# Patient Record
Sex: FEMALE | Race: WHITE | NOT HISPANIC OR LATINO
[De-identification: names, ages, dates, MRNs, and addresses within clinical notes are randomized per-mention and may not be internally consistent; named-entity substitution may affect disease eponyms.]

---

## 2018-06-28 PROBLEM — Z00.00 ENCOUNTER FOR PREVENTIVE HEALTH EXAMINATION: Status: ACTIVE | Noted: 2018-06-28

## 2018-07-12 ENCOUNTER — APPOINTMENT (OUTPATIENT)
Dept: OTOLARYNGOLOGY | Facility: CLINIC | Age: 38
End: 2018-07-12
Payer: COMMERCIAL

## 2018-07-12 VITALS — BODY MASS INDEX: 25.61 KG/M2 | HEIGHT: 64 IN | WEIGHT: 150 LBS

## 2018-07-12 DIAGNOSIS — Z80.3 FAMILY HISTORY OF MALIGNANT NEOPLASM OF BREAST: ICD-10-CM

## 2018-07-12 DIAGNOSIS — Z82.2 FAMILY HISTORY OF DEAFNESS AND HEARING LOSS: ICD-10-CM

## 2018-07-12 DIAGNOSIS — Z78.9 OTHER SPECIFIED HEALTH STATUS: ICD-10-CM

## 2018-07-12 DIAGNOSIS — E11.9 TYPE 2 DIABETES MELLITUS W/OUT COMPLICATIONS: ICD-10-CM

## 2018-07-12 DIAGNOSIS — Z80.51 FAMILY HISTORY OF MALIGNANT NEOPLASM OF KIDNEY: ICD-10-CM

## 2018-07-12 PROCEDURE — 92557 COMPREHENSIVE HEARING TEST: CPT

## 2018-07-12 PROCEDURE — 92550 TYMPANOMETRY & REFLEX THRESH: CPT

## 2018-07-12 PROCEDURE — 99203 OFFICE O/P NEW LOW 30 MIN: CPT | Mod: 25

## 2018-07-19 ENCOUNTER — OUTPATIENT (OUTPATIENT)
Dept: OUTPATIENT SERVICES | Facility: HOSPITAL | Age: 38
LOS: 1 days | Discharge: HOME | End: 2018-07-19

## 2018-07-19 DIAGNOSIS — Z12.31 ENCOUNTER FOR SCREENING MAMMOGRAM FOR MALIGNANT NEOPLASM OF BREAST: ICD-10-CM

## 2018-07-26 ENCOUNTER — APPOINTMENT (OUTPATIENT)
Dept: BREAST CENTER | Facility: CLINIC | Age: 38
End: 2018-07-26

## 2018-08-02 ENCOUNTER — OUTPATIENT (OUTPATIENT)
Dept: OUTPATIENT SERVICES | Facility: HOSPITAL | Age: 38
LOS: 1 days | Discharge: HOME | End: 2018-08-02

## 2018-08-02 DIAGNOSIS — H90.3 SENSORINEURAL HEARING LOSS, BILATERAL: ICD-10-CM

## 2018-08-09 ENCOUNTER — OUTPATIENT (OUTPATIENT)
Dept: OUTPATIENT SERVICES | Facility: HOSPITAL | Age: 38
LOS: 1 days | Discharge: HOME | End: 2018-08-09

## 2018-08-09 DIAGNOSIS — H90.3 SENSORINEURAL HEARING LOSS, BILATERAL: ICD-10-CM

## 2018-08-22 ENCOUNTER — OUTPATIENT (OUTPATIENT)
Dept: OUTPATIENT SERVICES | Facility: HOSPITAL | Age: 38
LOS: 1 days | Discharge: HOME | End: 2018-08-22

## 2018-08-23 DIAGNOSIS — H90.3 SENSORINEURAL HEARING LOSS, BILATERAL: ICD-10-CM

## 2018-08-28 ENCOUNTER — OUTPATIENT (OUTPATIENT)
Dept: OUTPATIENT SERVICES | Facility: HOSPITAL | Age: 38
LOS: 1 days | Discharge: HOME | End: 2018-08-28

## 2018-08-29 DIAGNOSIS — H90.3 SENSORINEURAL HEARING LOSS, BILATERAL: ICD-10-CM

## 2018-08-30 ENCOUNTER — OUTPATIENT (OUTPATIENT)
Dept: OUTPATIENT SERVICES | Facility: HOSPITAL | Age: 38
LOS: 1 days | Discharge: HOME | End: 2018-08-30

## 2018-08-30 DIAGNOSIS — H90.3 SENSORINEURAL HEARING LOSS, BILATERAL: ICD-10-CM

## 2018-10-02 ENCOUNTER — OUTPATIENT (OUTPATIENT)
Dept: OUTPATIENT SERVICES | Facility: HOSPITAL | Age: 38
LOS: 1 days | Discharge: HOME | End: 2018-10-02

## 2018-10-03 DIAGNOSIS — H90.3 SENSORINEURAL HEARING LOSS, BILATERAL: ICD-10-CM

## 2018-10-09 ENCOUNTER — OUTPATIENT (OUTPATIENT)
Dept: OUTPATIENT SERVICES | Facility: HOSPITAL | Age: 38
LOS: 1 days | Discharge: HOME | End: 2018-10-09

## 2018-10-12 DIAGNOSIS — H90.3 SENSORINEURAL HEARING LOSS, BILATERAL: ICD-10-CM

## 2018-10-18 ENCOUNTER — OUTPATIENT (OUTPATIENT)
Dept: OUTPATIENT SERVICES | Facility: HOSPITAL | Age: 38
LOS: 1 days | Discharge: HOME | End: 2018-10-18

## 2018-10-19 DIAGNOSIS — H90.3 SENSORINEURAL HEARING LOSS, BILATERAL: ICD-10-CM

## 2018-11-20 ENCOUNTER — OUTPATIENT (OUTPATIENT)
Dept: OUTPATIENT SERVICES | Facility: HOSPITAL | Age: 38
LOS: 1 days | Discharge: HOME | End: 2018-11-20

## 2018-11-26 DIAGNOSIS — H90.3 SENSORINEURAL HEARING LOSS, BILATERAL: ICD-10-CM

## 2019-02-28 ENCOUNTER — OUTPATIENT (OUTPATIENT)
Dept: OUTPATIENT SERVICES | Facility: HOSPITAL | Age: 39
LOS: 1 days | Discharge: HOME | End: 2019-02-28

## 2019-02-28 DIAGNOSIS — H90.3 SENSORINEURAL HEARING LOSS, BILATERAL: ICD-10-CM

## 2019-05-17 ENCOUNTER — APPOINTMENT (OUTPATIENT)
Dept: NEUROLOGY | Facility: CLINIC | Age: 39
End: 2019-05-17
Payer: COMMERCIAL

## 2019-05-17 PROCEDURE — 64615 CHEMODENERV MUSC MIGRAINE: CPT

## 2019-05-21 ENCOUNTER — RECORD ABSTRACTING (OUTPATIENT)
Age: 39
End: 2019-05-21

## 2019-05-21 DIAGNOSIS — K21.9 GASTRO-ESOPHAGEAL REFLUX DISEASE W/OUT ESOPHAGITIS: ICD-10-CM

## 2019-05-21 DIAGNOSIS — F32.9 MAJOR DEPRESSIVE DISORDER, SINGLE EPISODE, UNSPECIFIED: ICD-10-CM

## 2019-05-21 DIAGNOSIS — Z92.29 PERSONAL HISTORY OF OTHER DRUG THERAPY: ICD-10-CM

## 2019-05-21 DIAGNOSIS — F41.9 ANXIETY DISORDER, UNSPECIFIED: ICD-10-CM

## 2019-05-21 DIAGNOSIS — Z87.39 PERSONAL HISTORY OF OTHER DISEASES OF THE MUSCULOSKELETAL SYSTEM AND CONNECTIVE TISSUE: ICD-10-CM

## 2019-05-21 DIAGNOSIS — Z82.0 FAMILY HISTORY OF EPILEPSY AND OTHER DISEASES OF THE NERVOUS SYSTEM: ICD-10-CM

## 2019-05-21 DIAGNOSIS — Z86.73 PERSONAL HISTORY OF TRANSIENT ISCHEMIC ATTACK (TIA), AND CEREBRAL INFARCTION W/OUT RESIDUAL DEFICITS: ICD-10-CM

## 2019-05-21 DIAGNOSIS — E10.9 TYPE 1 DIABETES MELLITUS W/OUT COMPLICATIONS: ICD-10-CM

## 2019-05-21 DIAGNOSIS — Z82.61 FAMILY HISTORY OF ARTHRITIS: ICD-10-CM

## 2019-05-21 DIAGNOSIS — Z96.41 PRESENCE OF INSULIN PUMP (EXTERNAL) (INTERNAL): ICD-10-CM

## 2019-05-21 DIAGNOSIS — Z84.89 FAMILY HISTORY OF OTHER SPECIFIED CONDITIONS: ICD-10-CM

## 2019-05-21 RX ORDER — IBUPROFEN 200 MG/1
200 TABLET ORAL
Refills: 0 | Status: ACTIVE | COMMUNITY

## 2019-05-21 RX ORDER — CITALOPRAM 40 MG/1
40 TABLET, FILM COATED ORAL DAILY
Refills: 0 | Status: ACTIVE | COMMUNITY

## 2019-05-21 RX ORDER — LORATADINE 5 MG/5 ML
SOLUTION, ORAL ORAL
Refills: 0 | Status: ACTIVE | COMMUNITY

## 2019-05-21 RX ORDER — PANTOPRAZOLE 40 MG/1
40 TABLET, DELAYED RELEASE ORAL TWICE DAILY
Refills: 0 | Status: ACTIVE | COMMUNITY

## 2019-05-21 RX ORDER — OMEGA-3/DHA/EPA/FISH OIL 300-1000MG
400 CAPSULE ORAL
Refills: 0 | Status: ACTIVE | COMMUNITY

## 2019-05-21 RX ORDER — CLONAZEPAM 0.5 MG/1
0.5 TABLET ORAL
Refills: 0 | Status: ACTIVE | COMMUNITY

## 2019-05-21 RX ORDER — BACILLUS COAGULANS/INULIN 1B-250 MG
CAPSULE ORAL
Refills: 0 | Status: ACTIVE | COMMUNITY

## 2019-05-30 ENCOUNTER — RECORD ABSTRACTING (OUTPATIENT)
Age: 39
End: 2019-05-30

## 2019-06-04 ENCOUNTER — OUTPATIENT (OUTPATIENT)
Dept: OUTPATIENT SERVICES | Facility: HOSPITAL | Age: 39
LOS: 1 days | Discharge: HOME | End: 2019-06-04

## 2019-06-04 ENCOUNTER — OUTPATIENT (OUTPATIENT)
Dept: OUTPATIENT SERVICES | Facility: HOSPITAL | Age: 39
LOS: 1 days | Discharge: HOME | End: 2019-06-04
Payer: COMMERCIAL

## 2019-06-04 DIAGNOSIS — M22.2X1 PATELLOFEMORAL DISORDERS, RIGHT KNEE: ICD-10-CM

## 2019-06-04 PROCEDURE — 73564 X-RAY EXAM KNEE 4 OR MORE: CPT | Mod: 26,50

## 2019-06-04 PROCEDURE — 73522 X-RAY EXAM HIPS BI 3-4 VIEWS: CPT | Mod: 26

## 2019-06-05 DIAGNOSIS — H90.3 SENSORINEURAL HEARING LOSS, BILATERAL: ICD-10-CM

## 2019-06-10 ENCOUNTER — APPOINTMENT (OUTPATIENT)
Dept: NEUROLOGY | Facility: CLINIC | Age: 39
End: 2019-06-10
Payer: COMMERCIAL

## 2019-06-10 ENCOUNTER — APPOINTMENT (OUTPATIENT)
Dept: NEUROLOGY | Facility: CLINIC | Age: 39
End: 2019-06-10

## 2019-06-10 VITALS
BODY MASS INDEX: 25.61 KG/M2 | HEART RATE: 92 BPM | WEIGHT: 150 LBS | HEIGHT: 64 IN | SYSTOLIC BLOOD PRESSURE: 128 MMHG | DIASTOLIC BLOOD PRESSURE: 85 MMHG

## 2019-06-10 PROCEDURE — 99214 OFFICE O/P EST MOD 30 MIN: CPT

## 2019-06-10 NOTE — HISTORY OF PRESENT ILLNESS
[FreeTextEntry1] : Ms. Muller is a 38-year-old woman last seen by me in the office on 12/12/2019 for follow-up of her migraines.  She also had history of stroke.  She had in the past had low antithrombin III levels.  Hemoglobin A1c is elevated, last one was 7.6.  She saw hematology and oncology and was told everything was fine and did not have risk for clots.  Her son recently passed away.  She has been receiving Botox injections. \par \par She says the botox helps with migraines for about 2.5 months but last 2-3 weeks are not controlled well.  She increased her topamax recently after speaking with me and her  is here and says she has been more confused recently.  She has poor memory and sometimes will stare and not respond for periods at a time.  She also does not sleep well and her psychiatrist started her back on trazodone\par

## 2019-06-10 NOTE — PHYSICAL EXAM
[FreeTextEntry1] : Orientation: oriented to person, oriented to place and oriented to time. \par Attention: poor concentrating ability and visual attention was not decreased. \par Language: no difficulty naming common objects, no difficulty repeating a phrase, no difficulty writing a sentence, fluency intact, comprehension intact and reading intact. \par Fund of knowledge: displays adequate knowledge of personal past history. \par Cranial Nerves: visual acuity intact bilaterally, visual fields full to confrontation, pupils equal round and reactive to light, extraocular motion intact, facial sensation intact symmetrically, face symmetrical, hearing was intact bilaterally, tongue and palate midline, head turning and shoulder shrug symmetric and there was no tongue deviation with protrusion. \par Motor: muscle tone was normal in all four extremities, muscle strength was normal in all four extremities and normal bulk in all four extremities. \par Sensory exam: light touch, PP, Vibration was intact. \par Coordination:. normal gait. balance was intact. there was no past-pointing. no tremor present. \par Deep tendon reflexes: \par Biceps right 2+. Biceps left 2+.  \par Triceps right 2+. Triceps left 2+.  LOC\par Brachioradialis right 2+. Brachioradialis left 2+.  \par Patella right 2+. Patella left 2+.  \par Ankle jerk right 2+. Ankle jerk left 2+. \par Plantar responses normal on the right, normal on the left.  \par

## 2019-06-10 NOTE — DISCUSSION/SUMMARY
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 Her migraines appear to be well controlled with Botox however I believe the Topamax is giving her side effects. Will decrease Topamax to 50 mg one tab in the morning 2 at night and then one tab b.i.d. after 2 weeks. We'll also send her for a 48 hour and with her EEG neuropsych testing and have her followup care after both tests are complete

## 2019-06-10 NOTE — REVIEW OF SYSTEMS
[Confused or Disoriented] : confusion [Memory Lapses or Loss] : memory loss [Decr. Concentrating Ability] : decreased concentrating ability [Difficulty with Language] : ~M difficulty with language [Changed Thought Patterns] : changed thought patterns [Repeating Questions] : repeated questioning about recent events [Difficulties in Speech] : speech difficulties [Migraine Headache] : migraine headaches [Negative] : Heme/Lymph

## 2019-06-11 ENCOUNTER — RX CHANGE (OUTPATIENT)
Age: 39
End: 2019-06-11

## 2019-06-13 ENCOUNTER — OUTPATIENT (OUTPATIENT)
Dept: OUTPATIENT SERVICES | Facility: HOSPITAL | Age: 39
LOS: 1 days | Discharge: HOME | End: 2019-06-13

## 2019-06-13 DIAGNOSIS — H90.3 SENSORINEURAL HEARING LOSS, BILATERAL: ICD-10-CM

## 2019-06-14 ENCOUNTER — OTHER (OUTPATIENT)
Age: 39
End: 2019-06-14

## 2019-06-20 ENCOUNTER — APPOINTMENT (OUTPATIENT)
Dept: NEUROLOGY | Facility: CLINIC | Age: 39
End: 2019-06-20
Payer: COMMERCIAL

## 2019-06-20 PROCEDURE — 95816 EEG AWAKE AND DROWSY: CPT

## 2019-06-21 ENCOUNTER — APPOINTMENT (OUTPATIENT)
Dept: NEUROLOGY | Facility: CLINIC | Age: 39
End: 2019-06-21
Payer: COMMERCIAL

## 2019-06-21 PROCEDURE — 95953: CPT

## 2019-08-14 ENCOUNTER — OUTPATIENT (OUTPATIENT)
Dept: OUTPATIENT SERVICES | Facility: HOSPITAL | Age: 39
LOS: 1 days | Discharge: HOME | End: 2019-08-14

## 2019-08-14 DIAGNOSIS — R41.3 OTHER AMNESIA: ICD-10-CM

## 2019-08-14 DIAGNOSIS — G31.84 MILD COGNITIVE IMPAIRMENT OF UNCERTAIN OR UNKNOWN ETIOLOGY: ICD-10-CM

## 2019-09-04 ENCOUNTER — APPOINTMENT (OUTPATIENT)
Dept: NEUROLOGY | Facility: CLINIC | Age: 39
End: 2019-09-04
Payer: COMMERCIAL

## 2019-09-04 PROCEDURE — 64615 CHEMODENERV MUSC MIGRAINE: CPT

## 2019-09-04 NOTE — PROCEDURE
[FreeTextEntry2] : intractable migraine [FreeTextEntry1] : Botox  [FreeTextEntry3] : Procedure:  Botox Injection  \par \par Indication:  Intractable migraine\par \par Approval:  “No prior auth needed for Botox – [facility]”\par \par :  Inderjit Reza M.D.\par \par Operation:  Procedure explained.  Risks of bleeding, infection, pain, swallow difficulty, eyelid droop, neck weakness, generalized weakness explained.  Consent obtained.  Next two 100 units vials of Onabotulinum toxin Type A, both with Lot # [id number], exp: 03/2022 were mixed with 2 cc’s of normal saline each for a dilution of 5 units per 0.1 ml.  Next injections were performed using a 30 gauge ½” needle as follows:\par \par Procerus:  5 units\par :  5 units injected on right and 5 units injected on left\par Frontalis 10 units divided into two on right and 10 units divided into two locations on left\par Temporalis 20 units divided into four locations on right and 20 units divided into four locations on left.\par Occipitalis 15 units divided into 3 locations on right and 15 units divided into 3 locations on left.\par Cervical paraspinals 10 units divided into 2 locations on right and 10 units divided into 2 locations on the left.\par Trapezius muscles 15 units divided into 3 locations on right and 15 units divided into 3 locations on left.\par \par Total Botox injected:  155 units\par Total Botox discarded:  45 units\par \par Complications:  none\par \par Patient reports previous Botox injection worked well with significant reduction in migraine.  She notes her neurologist has discontinued Topamax treatment due to blurred vision and reports her vision improved.\par \par \par Inderjit Reza M.D.\par \par FP/dh/rr\par \par  [FreeTextEntry4] : none

## 2019-09-05 ENCOUNTER — MED ADMIN CHARGE (OUTPATIENT)
Age: 39
End: 2019-09-05

## 2019-09-05 RX ORDER — ONABOTULINUMTOXINA 100 [USP'U]/1
100 INJECTION, POWDER, LYOPHILIZED, FOR SOLUTION INTRADERMAL; INTRAMUSCULAR
Qty: 1 | Refills: 0 | Status: COMPLETED | OUTPATIENT
Start: 2019-09-04

## 2019-10-15 ENCOUNTER — OUTPATIENT (OUTPATIENT)
Dept: OUTPATIENT SERVICES | Facility: HOSPITAL | Age: 39
LOS: 1 days | Discharge: HOME | End: 2019-10-15

## 2019-10-17 DIAGNOSIS — H90.3 SENSORINEURAL HEARING LOSS, BILATERAL: ICD-10-CM

## 2019-11-11 ENCOUNTER — APPOINTMENT (OUTPATIENT)
Dept: NEUROLOGY | Facility: CLINIC | Age: 39
End: 2019-11-11
Payer: COMMERCIAL

## 2019-11-11 VITALS
HEART RATE: 111 BPM | HEIGHT: 64 IN | DIASTOLIC BLOOD PRESSURE: 78 MMHG | SYSTOLIC BLOOD PRESSURE: 146 MMHG | WEIGHT: 155 LBS | BODY MASS INDEX: 26.46 KG/M2

## 2019-11-11 PROCEDURE — 99214 OFFICE O/P EST MOD 30 MIN: CPT

## 2019-11-11 NOTE — HISTORY OF PRESENT ILLNESS
[FreeTextEntry1] : Ms. Muller is a 38-year-old woman last seen by me in the office on  6/10/2019 for follow-up of her migraines. She also had history of stroke. She had in the past had low antithrombin III levels. Hemoglobin A1c is elevated, last one was 7.6. She saw hematology and oncology and was told everything was fine and did not have risk for clots. 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 Reza and she stopped almost all her medications and is feeling clearer.\par Neuropsych testing showed severe depression and PTSD as cause for her cognitive complaints.

## 2019-11-11 NOTE — PHYSICAL EXAM
[FreeTextEntry1] : a+Ox3\par language and attention normal\par CN 2-12 normal\par Power 5/5 throughout\par decreased temp, vib on right extremities\par FTN NL\par DTR 0 throughout\par Gait normal\par  but unable to tandem and rhomberg absent\par

## 2019-11-11 NOTE — REVIEW OF SYSTEMS
[Feeling Poorly] : feeling poorly [Feeling Tired] : feeling tired [Tingling] : tingling [Cluster Headache] : cluster headaches [Migraine Headache] : migraine headaches [Tension Headache] : tension-type headaches [Depression] : depression [Emotional Problems] : emotional problems [Eyesight Problems] : eyesight problems [Loss Of Hearing] : hearing loss [Shortness Of Breath] : shortness of breath [Arthralgias] : arthralgias [Joint Pain] : joint pain [As Noted in HPI] : as noted in HPI [Negative] : Integumentary

## 2019-11-11 NOTE — DISCUSSION/SUMMARY
[FreeTextEntry1] : Ms. 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  She also gets severe headaches different form her migraines and associated with neck pain.  Her cognitive dysfunction was likely a combination of medication sided effects exacerbated by severe depression and PTSD\par 1. MRI brain w/o PHILL and MRI cervical spine w/o PHILL\par 2. EMG/NCS of upper and lower extremities (mitochnodrial dysfunction with hearing loss, neuropathy)\par

## 2019-11-19 ENCOUNTER — OUTPATIENT (OUTPATIENT)
Dept: OUTPATIENT SERVICES | Facility: HOSPITAL | Age: 39
LOS: 1 days | Discharge: HOME | End: 2019-11-19

## 2019-11-19 DIAGNOSIS — H90.3 SENSORINEURAL HEARING LOSS, BILATERAL: ICD-10-CM

## 2019-11-27 ENCOUNTER — APPOINTMENT (OUTPATIENT)
Dept: NEUROLOGY | Facility: CLINIC | Age: 39
End: 2019-11-27
Payer: COMMERCIAL

## 2019-11-27 DIAGNOSIS — R20.0 ANESTHESIA OF SKIN: ICD-10-CM

## 2019-11-27 PROCEDURE — 95910 NRV CNDJ TEST 7-8 STUDIES: CPT

## 2019-11-27 PROCEDURE — 95886 MUSC TEST DONE W/N TEST COMP: CPT

## 2019-12-13 ENCOUNTER — APPOINTMENT (OUTPATIENT)
Dept: NEUROLOGY | Facility: CLINIC | Age: 39
End: 2019-12-13
Payer: COMMERCIAL

## 2019-12-13 PROCEDURE — 64615 CHEMODENERV MUSC MIGRAINE: CPT

## 2019-12-13 RX ORDER — ONABOTULINUMTOXINA 100 [USP'U]/1
100 INJECTION, POWDER, LYOPHILIZED, FOR SOLUTION INTRADERMAL; INTRAMUSCULAR
Qty: 1 | Refills: 0 | Status: COMPLETED | OUTPATIENT
Start: 2019-12-13

## 2019-12-13 RX ADMIN — ONABOTULINUMTOXINA 0 UNIT: 100 INJECTION, POWDER, LYOPHILIZED, FOR SOLUTION INTRADERMAL; INTRAMUSCULAR at 00:00

## 2019-12-13 NOTE — PROCEDURE
[FreeTextEntry1] : Botox [FreeTextEntry2] : Intractable Migraine [FreeTextEntry4] : none [FreeTextEntry3] : Indication: Intractable migraine\par \par Approval: “No prior auth needed for Botox – [facility]”\par \par : Inderjit Reza M.D.\par \par Operation: Procedure explained. Risks of bleeding, infection, pain, swallow difficulty, eyelid droop, neck weakness, generalized weakness explained. Consent obtained. Next two 100 units vials of Onabotulinum toxin Type A, both with Lot # [id number], exp: 05/2022 were mixed with 2 cc’s of normal saline each for a dilution of 5 units per 0.1 ml. Next injections were performed using a 30 gauge ½” needle as follows:\par \par Procerus: 5 units\par : 5 units injected on right and 5 units injected on left\par Frontalis 10 units divided into two on right and 10 units divided into two locations on left\par Temporalis 20 units divided into four locations on right and 20 units divided into four locations on left.\par Occipitalis 15 units divided into 3 locations on right and 15 units divided into 3 locations on left.\par Cervical paraspinals 10 units divided into 2 locations on right and 10 units divided into 2 locations on the left.\par Trapezius muscles 15 units divided into 3 locations on right and 15 units divided into 3 locations on left.\par \par Total Botox injected: 155 units\par Total Botox discarded: 45 units\par \par Complications: none

## 2019-12-17 ENCOUNTER — OUTPATIENT (OUTPATIENT)
Dept: OUTPATIENT SERVICES | Facility: HOSPITAL | Age: 39
LOS: 1 days | Discharge: HOME | End: 2019-12-17

## 2019-12-18 DIAGNOSIS — H90.3 SENSORINEURAL HEARING LOSS, BILATERAL: ICD-10-CM

## 2020-01-31 ENCOUNTER — EMERGENCY (EMERGENCY)
Facility: HOSPITAL | Age: 40
LOS: 0 days | Discharge: HOME | End: 2020-01-31
Attending: EMERGENCY MEDICINE | Admitting: EMERGENCY MEDICINE
Payer: COMMERCIAL

## 2020-01-31 VITALS
OXYGEN SATURATION: 100 % | SYSTOLIC BLOOD PRESSURE: 150 MMHG | DIASTOLIC BLOOD PRESSURE: 85 MMHG | RESPIRATION RATE: 18 BRPM | HEART RATE: 82 BPM

## 2020-01-31 VITALS
SYSTOLIC BLOOD PRESSURE: 139 MMHG | DIASTOLIC BLOOD PRESSURE: 84 MMHG | WEIGHT: 154.98 LBS | RESPIRATION RATE: 18 BRPM | HEART RATE: 96 BPM | TEMPERATURE: 98 F
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
## 2020-01-31 DIAGNOSIS — R51 HEADACHE: ICD-10-CM
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
## 2020-01-31 DIAGNOSIS — Z79.899 OTHER LONG TERM (CURRENT) DRUG THERAPY: ICD-10-CM
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
## 2020-01-31 DIAGNOSIS — S60.512A ABRASION OF LEFT HAND, INITIAL ENCOUNTER: ICD-10-CM

## 2020-01-31 PROCEDURE — 71046 X-RAY EXAM CHEST 2 VIEWS: CPT | Mod: 26

## 2020-01-31 PROCEDURE — 73590 X-RAY EXAM OF LOWER LEG: CPT | Mod: 26,LT

## 2020-01-31 PROCEDURE — 73130 X-RAY EXAM OF HAND: CPT | Mod: 26,LT

## 2020-01-31 PROCEDURE — 70450 CT HEAD/BRAIN W/O DYE: CPT | Mod: 26

## 2020-01-31 PROCEDURE — 99285 EMERGENCY DEPT VISIT HI MDM: CPT

## 2020-01-31 RX ORDER — CITALOPRAM 10 MG/1
1 TABLET, FILM COATED ORAL
Qty: 0 | Refills: 0 | DISCHARGE

## 2020-01-31 RX ORDER — ONDANSETRON 8 MG/1
1 TABLET, FILM COATED ORAL
Qty: 15 | Refills: 0
Start: 2020-01-31 | End: 2020-02-04

## 2020-01-31 RX ORDER — ONDANSETRON 8 MG/1
8 TABLET, FILM COATED ORAL ONCE
Refills: 0 | Status: COMPLETED | OUTPATIENT
Start: 2020-01-31 | End: 2020-01-31

## 2020-01-31 RX ADMIN — Medication 1 APPLICATION(S): at 11:35

## 2020-01-31 RX ADMIN — ONDANSETRON 8 MILLIGRAM(S): 8 TABLET, FILM COATED ORAL at 11:36

## 2020-01-31 NOTE — ED PROVIDER NOTE - PHYSICAL EXAMINATION
GEN: NAD, comfortable  CARDIO: RRR, no m/r/g  RESP: CTAB, no w/r/r  ABD: soft, NT/ND, +BS  EXT: no edema, pp b/l, abrasion to L hand  NEURO: AAOx3, strength 5/5 UE + LE, no focal deficits, no facial asymmetry

## 2020-01-31 NOTE — ED PROVIDER NOTE - NSFOLLOWUPCLINICS_GEN_ALL_ED_FT
[facility] Concussion Program  Concussion Program  [address] [place] [address]   Phone: [phone]  Fax:   Follow Up Time:

## 2020-01-31 NOTE — ED PROVIDER NOTE - CARE PROVIDER_API CALL
Juan Carlos Tapia)  EEGEpilepsy; Neurology  [address] [place] [address], [address]  [place] [address]  Phone: [phone]  Fax: [phone]  Follow Up Time:
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
## 2020-01-31 NOTE — ED PROVIDER NOTE - CLINICAL SUMMARY MEDICAL DECISION MAKING FREE TEXT BOX
fx contemplated: unlikely after review of diagnostic testing. In my opinion, out patient treatment and follow up are appropriate.
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
## 2020-01-31 NOTE — ED PROVIDER NOTE - PATIENT PORTAL LINK FT
You can access the FollowMyHealth Patient Portal offered by [facility] by registering at the following website: [email or web address]. By joining [facility]’s FollowMyHealth portal, you will also be able to view your health information using other applications (apps) compatible with our system.
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
## 2020-01-31 NOTE — ED PROVIDER NOTE - NS ED ROS FT
REVIEW OF SYSTEMS:    CONSTITUTIONAL: No weakness, fevers or chills  EYES/ENT: No visual changes;  No vertigo or throat pain   NECK: No pain or stiffness  RESPIRATORY: No cough, wheezing, hemoptysis; No shortness of breath  CARDIOVASCULAR: No chest pain or palpitations  GASTROINTESTINAL: No abdominal or epigastric pain. No nausea, vomiting, or hematemesis; No diarrhea or constipation. No melena or hematochezia.  GENITOURINARY: No dysuria, frequency or hematuria  NEUROLOGICAL: No numbness or weakness  MSK: see HPI  SKIN: No itching, abrasion to L hand

## 2020-02-04 ENCOUNTER — EMERGENCY (EMERGENCY)
Facility: HOSPITAL | Age: 40
LOS: 0 days | Discharge: HOME | End: 2020-02-04
Attending: EMERGENCY MEDICINE | Admitting: EMERGENCY MEDICINE
Payer: COMMERCIAL

## 2020-02-04 VITALS
DIASTOLIC BLOOD PRESSURE: 88 MMHG | TEMPERATURE: 99 F | RESPIRATION RATE: 20 BRPM | SYSTOLIC BLOOD PRESSURE: 145 MMHG | HEART RATE: 97 BPM

## 2020-02-04 DIAGNOSIS — R06.02 SHORTNESS OF BREATH: ICD-10-CM

## 2020-02-04 DIAGNOSIS — E11.9 TYPE 2 DIABETES MELLITUS WITHOUT COMPLICATIONS: ICD-10-CM

## 2020-02-04 PROBLEM — H91.90 UNSPECIFIED HEARING LOSS, UNSPECIFIED EAR: Chronic | Status: ACTIVE | Noted: 2020-01-31

## 2020-02-04 PROBLEM — E88.41: Chronic | Status: ACTIVE | Noted: 2020-01-31

## 2020-02-04 PROCEDURE — 71045 X-RAY EXAM CHEST 1 VIEW: CPT | Mod: 26

## 2020-02-04 PROCEDURE — 93010 ELECTROCARDIOGRAM REPORT: CPT

## 2020-02-04 PROCEDURE — 99284 EMERGENCY DEPT VISIT MOD MDM: CPT

## 2020-02-04 NOTE — ED PROVIDER NOTE - PLAN OF CARE
CXR and EKG done were normal. Vital signs, including oxygen saturation were also normal. If you have any more episodes of similar difficulty breathing, or if you have any chest pain, fevers, or chills, contact a physician immediately or come back to ED.

## 2020-02-04 NOTE — ED PROVIDER NOTE - CARE PROVIDER_API CALL
Odell Servin)  Cardiovascular Disease; Interventional Cardiology  [address] [place] [address]  Phone: [phone]  Fax: [phone]  Follow Up Time:
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
## 2020-02-04 NOTE — ED PROVIDER NOTE - NS ED ROS FT
REVIEW OF SYSTEMS:    CONSTITUTIONAL: No weakness, fevers or chills  EYES/ENT: No visual changes;  No vertigo or throat pain   NECK: No pain or stiffness  RESPIRATORY: see HPI  CARDIOVASCULAR: No chest pain or palpitations  GASTROINTESTINAL: No abdominal or epigastric pain. No nausea, vomiting, or hematemesis; No diarrhea or constipation. No melena or hematochezia.  GENITOURINARY: No dysuria, frequency or hematuria  NEUROLOGICAL: No numbness or weakness  SKIN: No itching, rashes

## 2020-02-04 NOTE — ED PROVIDER NOTE - ATTENDING CONTRIBUTION TO CARE
38yo woman h/o MELAS, DM c/o brief episode of "choking" while at work. Pt was well until she put a piece of candy into her mouth which made it water, and suddenly she started to feel her throat was closing. She removed the candy, but continued to have diff breathing, with what, on her description, is suggestive of stridor. She continued to have these sx until EMS arrived and gave her oxygen, after which it completely resolved. She is asymptomatic in the ED. Pt reports that these sx are not like her prior panic attacks, however she admits to feeling panicked when she couldn't breathe. On exam she is nontoxic appearing and comfortable, with clear voice and normal HEENT exam. No stridor or hoarseness now, lungs CTA, CVS1S2 RRR abd soft, NT. Pt reports she feels back to baseline. Doubt CAD/PE/PNA as no sx now. Sounds like laryngospasm by history. Pt reports she may have had 1 or 2 similar episodes in the remote past. Will check EKG and CXR, likely d/c to f/u ENT/GI.

## 2020-02-04 NOTE — ED PROVIDER NOTE - PATIENT PORTAL LINK FT
You can access the FollowMyHealth Patient Portal offered by [facility] by registering at the following website: [email or web address]. By joining [employer]’s FollowMyHealth portal, you will also be able to view your health information using other applications (apps) compatible with our system.

## 2020-02-04 NOTE — ED PROVIDER NOTE - CLINICAL SUMMARY MEDICAL DECISION MAKING FREE TEXT BOX
EKG and CXR unremarkable. Sx suggestive of brief laryngospasm. Could be due to GERD, and discussed this w pt. She will f/u PMD and return to the ED for further episodes or any new sx.

## 2020-02-04 NOTE — ED PROVIDER NOTE - CARE PLAN
Principal Discharge DX:	SOB (shortness of breath)  Assessment and plan of treatment:	CXR and EKG done were normal. Vital signs, including oxygen saturation were also normal. If you have any more episodes of similar difficulty breathing, or if you have any chest pain, fevers, or chills, contact a physician immediately or come back to ED.

## 2020-02-04 NOTE — ED ADULT NURSE NOTE - OBJECTIVE STATEMENT
pt presents with difficulty breathing while at work x 15-20 min. symptoms have since resolved, denies c/o cp, n/v/d.

## 2020-03-04 PROBLEM — F39 UNSPECIFIED MOOD [AFFECTIVE] DISORDER: Chronic | Status: ACTIVE | Noted: 2020-02-04

## 2020-03-04 PROBLEM — G43.909 MIGRAINE, UNSPECIFIED, NOT INTRACTABLE, WITHOUT STATUS MIGRAINOSUS: Chronic | Status: ACTIVE | Noted: 2020-02-04

## 2020-03-04 PROBLEM — F41.9 ANXIETY DISORDER, UNSPECIFIED: Chronic | Status: ACTIVE | Noted: 2020-02-04

## 2020-03-12 ENCOUNTER — APPOINTMENT (OUTPATIENT)
Dept: NEUROLOGY | Facility: CLINIC | Age: 40
End: 2020-03-12

## 2020-03-13 ENCOUNTER — APPOINTMENT (OUTPATIENT)
Dept: NEUROLOGY | Facility: CLINIC | Age: 40
End: 2020-03-13

## 2020-04-17 ENCOUNTER — APPOINTMENT (OUTPATIENT)
Dept: NEUROLOGY | Facility: CLINIC | Age: 40
End: 2020-04-17
Payer: COMMERCIAL

## 2020-04-17 PROCEDURE — 95723 EEG PHY/QHP>60<84 HR W/O VID: CPT

## 2020-04-17 PROCEDURE — 95700 EEG CONT REC W/VID EEG TECH: CPT

## 2020-04-18 PROCEDURE — 95708 EEG WO VID EA 12-26HR UNMNTR: CPT

## 2020-04-19 PROCEDURE — 95708 EEG WO VID EA 12-26HR UNMNTR: CPT

## 2020-04-20 ENCOUNTER — APPOINTMENT (OUTPATIENT)
Dept: NEUROLOGY | Facility: CLINIC | Age: 40
End: 2020-04-20

## 2020-04-20 PROCEDURE — 95708 EEG WO VID EA 12-26HR UNMNTR: CPT

## 2020-05-08 ENCOUNTER — OUTPATIENT (OUTPATIENT)
Dept: OUTPATIENT SERVICES | Facility: HOSPITAL | Age: 40
LOS: 1 days | Discharge: HOME | End: 2020-05-08

## 2020-05-08 DIAGNOSIS — H90.3 SENSORINEURAL HEARING LOSS, BILATERAL: ICD-10-CM

## 2020-06-10 ENCOUNTER — OUTPATIENT (OUTPATIENT)
Dept: OUTPATIENT SERVICES | Facility: HOSPITAL | Age: 40
LOS: 1 days | Discharge: HOME | End: 2020-06-10

## 2020-06-10 DIAGNOSIS — H90.3 SENSORINEURAL HEARING LOSS, BILATERAL: ICD-10-CM

## 2020-06-17 ENCOUNTER — APPOINTMENT (OUTPATIENT)
Dept: NEUROLOGY | Facility: CLINIC | Age: 40
End: 2020-06-17
Payer: COMMERCIAL

## 2020-06-17 VITALS
OXYGEN SATURATION: 94 % | WEIGHT: 155 LBS | SYSTOLIC BLOOD PRESSURE: 142 MMHG | DIASTOLIC BLOOD PRESSURE: 97 MMHG | HEIGHT: 71 IN | BODY MASS INDEX: 21.7 KG/M2 | TEMPERATURE: 98 F | HEART RATE: 94 BPM

## 2020-06-17 PROCEDURE — 64615 CHEMODENERV MUSC MIGRAINE: CPT

## 2020-06-17 RX ADMIN — ONABOTULINUMTOXINA 0 UNIT: 100 INJECTION, POWDER, LYOPHILIZED, FOR SOLUTION INTRADERMAL; INTRAMUSCULAR at 00:00

## 2020-06-17 NOTE — PROCEDURE
[FreeTextEntry1] : Botox [FreeTextEntry3] : Procedure Performed: Botox. \par Indications: Intractable Migraine. \par Anesthesia: none. \par Procedure Note: Indication: Intractable migraine\par \par Approval: “No prior auth needed for Botox – [facility]”\par \par : Inderjit Reza M.D.\par \par Operation: Procedure explained. Risks of bleeding, infection, pain, swallow difficulty, eyelid droop, neck weakness, generalized weakness explained. Consent obtained. Next two 100 units vials of Onabotulinum toxin Type A, both with Lot # [id number], exp: 12/2022 were mixed with 2 cc’s of normal saline each for a dilution of 5 units per 0.1 ml. Next injections were performed using a 30 gauge ½” needle as follows:\par \par Procerus: 5 units\par : 5 units injected on right and 5 units injected on left\par Frontalis 10 units divided into two on right and 10 units divided into two locations on left\par Temporalis 20 units divided into four locations on right and 20 units divided into four locations on left.\par Occipitalis 15 units divided into 3 locations on right and 15 units divided into 3 locations on left.\par Cervical paraspinals 10 units divided into 2 locations on right and 10 units divided into 2 locations on the left.\par Trapezius muscles 15 units divided into 3 locations on right and 15 units divided into 3 locations on left.\par \par Total Botox injected: 155 units\par Total Botox discarded: 45 units\par \par Complications: none.  [FreeTextEntry4] : none [FreeTextEntry2] : Intractable migraine

## 2020-06-18 ENCOUNTER — MED ADMIN CHARGE (OUTPATIENT)
Age: 40
End: 2020-06-18

## 2020-06-18 RX ORDER — ONABOTULINUMTOXINA 100 [USP'U]/1
100 INJECTION, POWDER, LYOPHILIZED, FOR SOLUTION INTRADERMAL; INTRAMUSCULAR
Qty: 1 | Refills: 0 | Status: COMPLETED | OUTPATIENT
Start: 2020-06-17

## 2020-06-23 ENCOUNTER — TRANSCRIPTION ENCOUNTER (OUTPATIENT)
Age: 40
End: 2020-06-23

## 2020-07-17 DIAGNOSIS — R10.30 LOWER ABDOMINAL PAIN, UNSPECIFIED: ICD-10-CM

## 2020-07-17 DIAGNOSIS — M25.561 PAIN IN RIGHT KNEE: ICD-10-CM

## 2020-07-17 DIAGNOSIS — M25.562 PAIN IN RIGHT KNEE: ICD-10-CM

## 2020-07-26 ENCOUNTER — OUTPATIENT (OUTPATIENT)
Dept: OUTPATIENT SERVICES | Facility: HOSPITAL | Age: 40
LOS: 1 days | Discharge: HOME | End: 2020-07-26

## 2020-07-26 DIAGNOSIS — Z11.59 ENCOUNTER FOR SCREENING FOR OTHER VIRAL DISEASES: ICD-10-CM

## 2020-07-29 ENCOUNTER — APPOINTMENT (OUTPATIENT)
Dept: NEUROLOGY | Facility: CLINIC | Age: 40
End: 2020-07-29
Payer: COMMERCIAL

## 2020-07-29 VITALS — TEMPERATURE: 98.4 F

## 2020-07-29 LAB — SARS-COV-2 N GENE NPH QL NAA+PROBE: NOT DETECTED

## 2020-07-29 PROCEDURE — 95886 MUSC TEST DONE W/N TEST COMP: CPT

## 2020-07-29 PROCEDURE — 95910 NRV CNDJ TEST 7-8 STUDIES: CPT

## 2020-08-19 ENCOUNTER — APPOINTMENT (OUTPATIENT)
Dept: NEUROLOGY | Facility: CLINIC | Age: 40
End: 2020-08-19
Payer: COMMERCIAL

## 2020-08-19 VITALS
WEIGHT: 155 LBS | DIASTOLIC BLOOD PRESSURE: 93 MMHG | SYSTOLIC BLOOD PRESSURE: 161 MMHG | TEMPERATURE: 97.7 F | BODY MASS INDEX: 21.7 KG/M2 | OXYGEN SATURATION: 98 % | HEIGHT: 71 IN | HEART RATE: 104 BPM

## 2020-08-19 PROCEDURE — 99215 OFFICE O/P EST HI 40 MIN: CPT

## 2020-08-19 NOTE — PHYSICAL EXAM
[FreeTextEntry1] : a+Ox3 language and attention normal \par CN 2-12 normal\par power diminished in right knee flexion/extension\par Decreased sensation in femoral nerve distribution on the right\par DTR absent throughout\par FTN NL\par Gait antalgic\par

## 2020-08-19 NOTE — DISCUSSION/SUMMARY
[FreeTextEntry1] : Ms. 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 Will check if recent injection covered around femoral nerve around inguinal ligament\par 2. Will check if imaging covers the femoral nerve around the inguinal ligament\par 3. If off well butryn will try lyrica and would give few days of tramadol\par 4. Will follow over next few weeks

## 2020-08-19 NOTE — HISTORY OF PRESENT ILLNESS
[FreeTextEntry1] : Ms. 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  She has had 2 injections with pain management which have not helped much only slightly.  She had MRI lumbar spine and pelvis with no significant abnormalities.  EMG suggested right lumbar plexus dysfunction vs L2-L4 radiculopathy.  She beleves the pain is not improving much.  Her hgba1c is 6.8 which is better then last hgba1c over 7.

## 2020-08-19 NOTE — REVIEW OF SYSTEMS
[Arthralgias] : arthralgias [As Noted in HPI] : as noted in HPI [Joint Pain] : joint pain [Negative] : ENT

## 2020-09-18 ENCOUNTER — APPOINTMENT (OUTPATIENT)
Dept: NEUROLOGY | Facility: CLINIC | Age: 40
End: 2020-09-18
Payer: COMMERCIAL

## 2020-09-18 PROCEDURE — 64615 CHEMODENERV MUSC MIGRAINE: CPT

## 2020-09-18 RX ADMIN — ONABOTULINUMTOXINA 0 UNIT: 100 INJECTION, POWDER, LYOPHILIZED, FOR SOLUTION INTRADERMAL; INTRAMUSCULAR at 00:00

## 2020-09-18 NOTE — PROCEDURE
[FreeTextEntry1] : Botox [FreeTextEntry2] : Intractable Migraine [FreeTextEntry4] : none [FreeTextEntry3] : : Inderjit Reza M.D.\par \par Operation: Procedure explained. Risks of bleeding, infection, pain, swallow difficulty, eyelid droop, neck weakness, generalized weakness explained. Consent obtained. Next two 100 units vials of Onabotulinum toxin Type A, both with Lot # [id number], exp: 04/2023 were mixed with 2 cc’s of normal saline each for a dilution of 5 units per 0.1 ml. Next injections were performed using a 30 gauge ½” needle as follows:\par \par Procerus: 5 units\par : 5 units injected on right and 5 units injected on left\par Frontalis 10 units divided into two on right and 10 units divided into two locations on left\par Temporalis 20 units divided into four locations on right and 20 units divided into four locations on left.\par Occipitalis 15 units divided into 3 locations on right and 15 units divided into 3 locations on left.\par Cervical paraspinals 10 units divided into 2 locations on right and 10 units divided into 2 locations on the left.\par Trapezius muscles 15 units divided into 3 locations on right and 15 units divided into 3 locations on left.\par \par Total Botox injected: 155 units\par Total Botox discarded: 45 units\par \par Complications: none.

## 2020-09-21 ENCOUNTER — MED ADMIN CHARGE (OUTPATIENT)
Age: 40
End: 2020-09-21

## 2020-09-21 RX ORDER — ONABOTULINUMTOXINA 100 [USP'U]/1
100 INJECTION, POWDER, LYOPHILIZED, FOR SOLUTION INTRADERMAL; INTRAMUSCULAR
Qty: 1 | Refills: 0 | Status: COMPLETED | OUTPATIENT
Start: 2020-09-18

## 2021-01-06 ENCOUNTER — OUTPATIENT (OUTPATIENT)
Dept: OUTPATIENT SERVICES | Facility: HOSPITAL | Age: 41
LOS: 1 days | Discharge: HOME | End: 2021-01-06

## 2021-01-07 DIAGNOSIS — H90.3 SENSORINEURAL HEARING LOSS, BILATERAL: ICD-10-CM

## 2021-01-20 ENCOUNTER — APPOINTMENT (OUTPATIENT)
Dept: NEUROLOGY | Facility: CLINIC | Age: 41
End: 2021-01-20
Payer: COMMERCIAL

## 2021-01-20 VITALS — TEMPERATURE: 98.3 F

## 2021-01-20 PROCEDURE — 64615 CHEMODENERV MUSC MIGRAINE: CPT

## 2021-01-20 PROCEDURE — 99072 ADDL SUPL MATRL&STAF TM PHE: CPT

## 2021-01-20 RX ORDER — ONABOTULINUMTOXINA 100 [USP'U]/1
100 INJECTION, POWDER, LYOPHILIZED, FOR SOLUTION INTRADERMAL; INTRAMUSCULAR
Qty: 1 | Refills: 0 | Status: COMPLETED | OUTPATIENT
Start: 2021-01-20

## 2021-01-20 RX ADMIN — ONABOTULINUMTOXINA 0 UNIT: 100 INJECTION, POWDER, LYOPHILIZED, FOR SOLUTION INTRADERMAL; INTRAMUSCULAR at 00:00

## 2021-01-20 NOTE — PROCEDURE
[FreeTextEntry1] : Botox [FreeTextEntry2] : Intractable Migraine [FreeTextEntry4] : none [FreeTextEntry3] : Operation: Procedure explained. Risks of bleeding, infection, pain, swallow difficulty, eyelid droop, neck weakness, generalized weakness explained. Consent obtained. Next one 100 units vials of Onabotulinum toxin Type A, Lot # [id number], exp: 09/2023 and another 100 unit vial of Onabotulinum toxin Type A, Lot # [id number], exp: 08/2023 were mixed with 2 cc’s of normal saline each for a dilution of 5 units per 0.1 ml. Next injections were performed using a 30 gauge ½” needle as follows:\par \par Procerus: 5 units\par : 5 units injected on right and 5 units injected on left\par Frontalis 10 units divided into two on right and 10 units divided into two locations on left\par Temporalis 20 units divided into four locations on right and 20 units divided into four locations on left.\par Occipitalis 15 units divided into 3 locations on right and 15 units divided into 3 locations on left.\par Cervical paraspinals 10 units divided into 2 locations on right and 10 units divided into 2 locations on the left.\par Trapezius muscles 15 units divided into 3 locations on right and 15 units divided into 3 locations on left.\par \par Total Botox injected: 155 units\par Total Botox discarded: 45 units\par \par Complications: none.

## 2021-01-21 NOTE — ED PROVIDER NOTE - NS ED ATTENDING STATEMENT MOD
We are committed to providing our patients with their test results in a timely manner. If you have access to [facility] [employer], you will receive your results within 5 to 7 days. If your results are normal, a member of our office may call you or you may receive a letter in the mail within 10 to 15 days of your testing. If your results have something additional to discuss, a member of our office will contact you by phone. If at any time you have questions related your results, please feel free to call our office at [phone]    
I have personally seen and examined this patient.  I have fully participated in the care of this patient. I have reviewed all pertinent clinical information, including history, physical exam, plan and the Resident’s note and agree except as noted.

## 2021-07-01 ENCOUNTER — OUTPATIENT (OUTPATIENT)
Dept: OUTPATIENT SERVICES | Facility: HOSPITAL | Age: 41
LOS: 1 days | Discharge: HOME | End: 2021-07-01

## 2021-07-01 DIAGNOSIS — H90.3 SENSORINEURAL HEARING LOSS, BILATERAL: ICD-10-CM

## 2021-07-14 ENCOUNTER — APPOINTMENT (OUTPATIENT)
Dept: NEUROLOGY | Facility: CLINIC | Age: 41
End: 2021-07-14
Payer: COMMERCIAL

## 2021-07-14 PROCEDURE — 64615 CHEMODENERV MUSC MIGRAINE: CPT

## 2021-07-14 RX ORDER — ONABOTULINUMTOXINA 100 [USP'U]/1
100 INJECTION, POWDER, LYOPHILIZED, FOR SOLUTION INTRADERMAL; INTRAMUSCULAR
Qty: 1 | Refills: 0 | Status: COMPLETED | OUTPATIENT
Start: 2021-07-14

## 2021-07-14 RX ADMIN — ONABOTULINUMTOXINA 0 UNIT: 100 INJECTION, POWDER, LYOPHILIZED, FOR SOLUTION INTRADERMAL; INTRAMUSCULAR at 00:00

## 2021-07-14 NOTE — PROCEDURE
[FreeTextEntry1] : Botox injection [FreeTextEntry2] : Intractable migraine [FreeTextEntry4] : none [FreeTextEntry3] : Procedure Performed: Botox. \par Indications: Intractable Migraine. \par Anesthesia: none. \par Procedure Note: Operation: Procedure explained. Risks of bleeding, infection, pain, swallow difficulty, eyelid droop, neck weakness, generalized weakness explained. Consent obtained. Next one 100 units vials of Onabotulinum toxin Type A, Lot # [id number], exp: 10/2023 and another 100 unit vial of Onabotulinum toxin Type A, Lot # [id number], exp: 10/2023 were mixed with 2 cc’s of normal saline each for a dilution of 5 units per 0.1 ml. Next injections were performed using a 30 gauge ½” needle as follows:\par \par Procerus: 5 units\par : 5 units injected on right and 5 units injected on left\par Frontalis 10 units divided into two on right and 10 units divided into two locations on left\par Temporalis 20 units divided into four locations on right and 20 units divided into four locations on left.\par Occipitalis 15 units divided into 3 locations on right and 15 units divided into 3 locations on left.\par Cervical paraspinals 10 units divided into 2 locations on right and 10 units divided into 2 locations on the left.\par Trapezius muscles 15 units divided into 3 locations on right and 15 units divided into 3 locations on left.\par \par Total Botox injected: 155 units\par Total Botox discarded: 45 units\par \par Complications: none. \par  \par

## 2021-07-16 ENCOUNTER — OUTPATIENT (OUTPATIENT)
Dept: OUTPATIENT SERVICES | Facility: HOSPITAL | Age: 41
LOS: 1 days | Discharge: HOME | End: 2021-07-16

## 2021-07-16 DIAGNOSIS — H90.3 SENSORINEURAL HEARING LOSS, BILATERAL: ICD-10-CM

## 2021-10-06 ENCOUNTER — NON-APPOINTMENT (OUTPATIENT)
Age: 41
End: 2021-10-06

## 2021-10-06 ENCOUNTER — APPOINTMENT (OUTPATIENT)
Dept: NEUROLOGY | Facility: CLINIC | Age: 41
End: 2021-10-06
Payer: COMMERCIAL

## 2021-10-06 PROCEDURE — 64615 CHEMODENERV MUSC MIGRAINE: CPT

## 2021-10-06 RX ADMIN — ONABOTULINUMTOXINA 0 UNIT: 100 INJECTION, POWDER, LYOPHILIZED, FOR SOLUTION INTRADERMAL; INTRAMUSCULAR at 00:00

## 2021-10-06 NOTE — PROCEDURE
[FreeTextEntry1] : Botox Injection [FreeTextEntry2] : Intractable migraine [FreeTextEntry4] : none [FreeTextEntry3] : Procedure Note: Procedure explained, risks of bleeding, infection, eyelid droop, neck weakness explained. She states had some eyelid droop so I gave the injections a little higher on forehead, using same dose.\par Two 100 unit vials of onabotulinum toxin both with L#  [id number], Exp: 01/2024  was mixed with 2 cc's of NS for a dilution of 5 units per 0.1 cc. The following muscles were injected using 30 gauge 1/2" needle:\par \par Procerus 5 units in one location\par  5 units in one location on right and 5 units in one location on left.\par Frontalis 10 units divided into two locations on right and 10 units divided into two locations on left.\par Temporalis 20 units divided into four locations on right and 20 units divided into four locations on left.\par Occipitalis 15 units divided into three locations on right and 15 units divided into three locations on left.\par Cervical paraspinals 10 units divided into two locations on right and 10 units divided into two locations on left.\par Trapezius 15 units divided into three locations on right and 15 units divided into three locations on left.\par ________________\par Total: 155 units\par \par Complications none\par EBL: trace

## 2021-10-07 RX ORDER — ONABOTULINUMTOXINA 100 [USP'U]/1
100 INJECTION, POWDER, LYOPHILIZED, FOR SOLUTION INTRADERMAL; INTRAMUSCULAR
Qty: 1 | Refills: 0 | Status: COMPLETED | OUTPATIENT
Start: 2021-10-06

## 2021-10-12 NOTE — ED PROVIDER NOTE - BIRTH SEX
Female I have personally performed a face to face diagnostic evaluation on this patient. I have reviewed the ACP note and agree with the history, exam and plan of care, except as noted.

## 2021-12-31 ENCOUNTER — OUTPATIENT (OUTPATIENT)
Dept: OUTPATIENT SERVICES | Facility: HOSPITAL | Age: 41
LOS: 1 days | Discharge: HOME | End: 2021-12-31
Payer: COMMERCIAL

## 2021-12-31 DIAGNOSIS — Z12.31 ENCOUNTER FOR SCREENING MAMMOGRAM FOR MALIGNANT NEOPLASM OF BREAST: ICD-10-CM

## 2021-12-31 PROCEDURE — 77063 BREAST TOMOSYNTHESIS BI: CPT | Mod: 26

## 2021-12-31 PROCEDURE — 77067 SCR MAMMO BI INCL CAD: CPT | Mod: 26

## 2022-01-12 ENCOUNTER — APPOINTMENT (OUTPATIENT)
Dept: NEUROLOGY | Facility: CLINIC | Age: 42
End: 2022-01-12
Payer: COMMERCIAL

## 2022-01-12 VITALS — TEMPERATURE: 97 F

## 2022-01-12 PROCEDURE — 64615 CHEMODENERV MUSC MIGRAINE: CPT

## 2022-01-12 RX ADMIN — ONABOTULINUMTOXINA 0 UNIT: 100 INJECTION, POWDER, LYOPHILIZED, FOR SOLUTION INTRADERMAL; INTRAMUSCULAR at 00:00

## 2022-01-12 NOTE — PROCEDURE
[FreeTextEntry1] : Botox Injection [FreeTextEntry2] : Intractable migraine [FreeTextEntry4] : none [FreeTextEntry3] : Procedure Note: Procedure explained, risks of bleeding, infection, eyelid droop, neck weakness explained. She states had some eyelid droop so I gave the injections a little higher on forehead, using same dose.\par Two 100 unit vials of onabotulinum with L#:  [id number], exp: 02/2024  and L#:  [id number], exp: 02/2024 was mixed with 2 cc's of NS for a dilution of 5 units per 0.1 cc. The following muscles were injected using 30 gauge 1/2" needle:\par \par Procerus 5 units in one location\par  5 units in one location on right and 5 units in one location on left.\par Frontalis 10 units divided into two locations on right and 10 units divided into two locations on left.\par Temporalis 20 units divided into four locations on right and 20 units divided into four locations on left.\par Occipitalis 15 units divided into three locations on right and 15 units divided into three locations on left.\par Cervical paraspinals 10 units divided into two locations on right and 10 units divided into two locations on left.\par Trapezius 15 units divided into three locations on right and 15 units divided into three locations on left.\par ________________\par Total: 155 units\par \par Complications none\par EBL: trace

## 2022-01-18 ENCOUNTER — MED ADMIN CHARGE (OUTPATIENT)
Age: 42
End: 2022-01-18

## 2022-01-18 RX ORDER — ONABOTULINUMTOXINA 100 [USP'U]/1
100 INJECTION, POWDER, LYOPHILIZED, FOR SOLUTION INTRADERMAL; INTRAMUSCULAR
Qty: 1 | Refills: 0 | Status: COMPLETED | OUTPATIENT
Start: 2022-01-12

## 2022-04-27 ENCOUNTER — APPOINTMENT (OUTPATIENT)
Dept: NEUROLOGY | Facility: CLINIC | Age: 42
End: 2022-04-27
Payer: COMMERCIAL

## 2022-04-27 PROCEDURE — 64615 CHEMODENERV MUSC MIGRAINE: CPT

## 2022-04-27 RX ADMIN — ONABOTULINUMTOXINA 0 UNIT: 100 INJECTION, POWDER, LYOPHILIZED, FOR SOLUTION INTRADERMAL; INTRAMUSCULAR at 00:00

## 2022-04-27 NOTE — PROCEDURE
[FreeTextEntry1] : Botox Injection [FreeTextEntry2] : Intractable migraine [FreeTextEntry4] : none [FreeTextEntry3] : Procedure Note: Procedure explained, risks of bleeding, infection, eyelid droop, neck weakness explained.  She notes after the Botox injected for about 2 1/2 months she has about one migraine per week, usually while at school.  She tried sumatriptan and zolmitriptan in the past she states w/o benefit.  She takes excedrin migraine which helps a little and sometimes takes a nausea med.  She has to have a para help her at school during her migraines.  Suggested she inquire about Nurtec or Ubrelvy.\par \par Two 100 unit vials of onabotulinum both with L#:  [id number], exp: 2024/05  were mixed with 2 cc's of NS for a dilution of 5 units per 0.1 cc. The following muscles were injected using 30 gauge 1/2" needle:\par \par Procerus 5 units in one location\par  5 units in one location on right and 5 units in one location on left.\par Frontalis 10 units divided into two locations on right and 10 units divided into two locations on left.\par Temporalis 20 units divided into four locations on right and 20 units divided into four locations on left.\par Occipitalis 15 units divided into three locations on right and 15 units divided into three locations on left.\par Cervical paraspinals 10 units divided into two locations on right and 10 units divided into two locations on left.\par Trapezius 15 units divided into three locations on right and 15 units divided into three locations on left.\par ________________\par Total: 155 units\par \par Complications none\par EBL: trace

## 2022-06-29 ENCOUNTER — APPOINTMENT (OUTPATIENT)
Dept: NEUROLOGY | Facility: CLINIC | Age: 42
End: 2022-06-29

## 2022-06-29 VITALS
HEIGHT: 71 IN | OXYGEN SATURATION: 98 % | DIASTOLIC BLOOD PRESSURE: 91 MMHG | HEART RATE: 80 BPM | SYSTOLIC BLOOD PRESSURE: 136 MMHG | BODY MASS INDEX: 22.4 KG/M2 | WEIGHT: 160 LBS

## 2022-06-29 DIAGNOSIS — M79.604 PAIN IN RIGHT LEG: ICD-10-CM

## 2022-06-29 DIAGNOSIS — H91.90 UNSPECIFIED HEARING LOSS, UNSPECIFIED EAR: ICD-10-CM

## 2022-06-29 PROCEDURE — 99215 OFFICE O/P EST HI 40 MIN: CPT

## 2022-06-29 RX ORDER — AMITRIPTYLINE HYDROCHLORIDE 10 MG/1
10 TABLET, FILM COATED ORAL TWICE DAILY
Qty: 120 | Refills: 3 | Status: DISCONTINUED | COMMUNITY
Start: 2020-09-11 | End: 2022-06-29

## 2022-06-30 PROBLEM — H91.90 HEARING LOSS: Status: ACTIVE | Noted: 2018-07-12

## 2022-06-30 PROBLEM — M79.604 PAIN OF RIGHT LOWER EXTREMITY: Status: ACTIVE | Noted: 2020-07-29

## 2022-06-30 NOTE — PHYSICAL EXAM
[FreeTextEntry1] : a+Ox3 language and attention normal \par CN 2-12 normal\par power diminished in right knee flexion/extension as well as in dorsiflexion\par Decreased sensation in femoral nerve distribution on the right as well as in the right UE to Temp and Vib\par DTR absent throughout\par FTN NL\par Gait antalgic\par

## 2022-06-30 NOTE — HISTORY OF PRESENT ILLNESS
[FreeTextEntry1] : Ms. 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  She has been getting pains in her legs and continues to have difficulty walking less than a block.  She requires rest when walking and will need to sit frequently.  She has not been exercising recently as she has had a number of hopsitalizations for uterine bleeding requiring transfusions.\par She still gets burning down mostly her right leg\par \par EMG suggested right lumbar plexus dysfunction vs L2-L4 radiculopathy.  She beleves the pain is not improving much.  Her hgba1c is 6.8 which is better then last hgba1c over 7.

## 2022-06-30 NOTE — DISCUSSION/SUMMARY
[FreeTextEntry1] : Ms. Muller is here for follow up of her severe pain and weakness which appears to follow the pattern of a femoral neuropathy.  She also has history of cervical spine disease, prior stroke, migraines, hearing loss, neuropathy (possibly diabetic).  She has been dealing with pain and difficulty with ambulation.  She fatigues very easily in lower extremities.  She would qualify for an aide to assist her in school to help when she is required to walk or move any significant distance.\par 1. Continue physical therapy\par 2. Continue Botox for migraines\par

## 2022-08-03 ENCOUNTER — APPOINTMENT (OUTPATIENT)
Dept: NEUROLOGY | Facility: CLINIC | Age: 42
End: 2022-08-03

## 2022-08-03 PROCEDURE — 64615 CHEMODENERV MUSC MIGRAINE: CPT

## 2022-08-03 RX ORDER — ONABOTULINUMTOXINA 100 [USP'U]/1
100 INJECTION, POWDER, LYOPHILIZED, FOR SOLUTION INTRADERMAL; INTRAMUSCULAR
Qty: 1 | Refills: 0 | Status: COMPLETED | OUTPATIENT
Start: 2022-08-03

## 2022-08-03 RX ADMIN — ONABOTULINUMTOXINA 0 UNIT: 100 INJECTION, POWDER, LYOPHILIZED, FOR SOLUTION INTRADERMAL; INTRAMUSCULAR at 00:00

## 2022-08-03 NOTE — PROCEDURE
[FreeTextEntry1] : Botox Injection [FreeTextEntry2] : Intractable migraine [FreeTextEntry4] : none [FreeTextEntry3] : Procedure Note: Procedure explained, risks of bleeding, infection, eyelid droop, neck weakness explained.  She notes after the Botox injected for about 2 1/2 months she has about one migraine per week, usually while at school.  She tried sumatriptan and zolmitriptan in the past she states w/o benefit.  She takes excedrin migraine which helps a little and sometimes takes a nausea med.  She has to have a para help her at school during her migraines. States migraines have been worse lately.  She is reaching out to her neurologist.\par \par Two 100 unit vials of onabotulinum both with L#:  [id number], exp: 2024/06  were mixed with 2 cc's of NS for a dilution of 5 units per 0.1 cc. The following muscles were injected using 30 gauge 1/2" needle:\par \par Procerus 5 units in one location\par  5 units in one location on right and 5 units in one location on left.\par Frontalis 10 units divided into two locations on right and 10 units divided into two locations on left.\par Temporalis 20 units divided into four locations on right and 20 units divided into four locations on left.\par Occipitalis 15 units divided into three locations on right and 15 units divided into three locations on left.\par Cervical paraspinals 10 units divided into two locations on right and 10 units divided into two locations on left.\par Trapezius 15 units divided into three locations on right and 15 units divided into three locations on left.\par ________________\par Total: 155 units\par \par Complications none\par EBL: trace

## 2022-08-16 ENCOUNTER — APPOINTMENT (OUTPATIENT)
Dept: SPEECH THERAPY | Facility: CLINIC | Age: 42
End: 2022-08-16

## 2022-08-16 ENCOUNTER — OUTPATIENT (OUTPATIENT)
Dept: OUTPATIENT SERVICES | Facility: HOSPITAL | Age: 42
LOS: 1 days | Discharge: HOME | End: 2022-08-16

## 2022-08-16 DIAGNOSIS — H90.3 SENSORINEURAL HEARING LOSS, BILATERAL: ICD-10-CM

## 2022-11-03 ENCOUNTER — APPOINTMENT (OUTPATIENT)
Dept: NEUROLOGY | Facility: CLINIC | Age: 42
End: 2022-11-03

## 2022-12-29 ENCOUNTER — NON-APPOINTMENT (OUTPATIENT)
Age: 42
End: 2022-12-29

## 2022-12-29 ENCOUNTER — APPOINTMENT (OUTPATIENT)
Dept: NEUROLOGY | Facility: CLINIC | Age: 42
End: 2022-12-29
Payer: COMMERCIAL

## 2022-12-29 PROCEDURE — 64615 CHEMODENERV MUSC MIGRAINE: CPT

## 2022-12-29 RX ADMIN — ONABOTULINUMTOXINA 55 UNIT: 100 INJECTION, POWDER, LYOPHILIZED, FOR SOLUTION INTRADERMAL; INTRAMUSCULAR at 00:00

## 2022-12-29 NOTE — PROCEDURE
[FreeTextEntry3] : Botulinum toxin for chronic migraine\par \par Risks and benefits of procedure explained. Consent for botulinum toxin for chronic migraine signed in chart. \par \par 200 Units botulinum toxin (Lot # [id number] C4, exp: 3/2025 X2  ) reconstituted with 4 ml 0.9NS (5U/0.1cc). \par \par Patient prepped with alcohol/ethyl chloride. 10U corrugators, 5U procerus, 20U frontalis, 40 temporalis, 30 occipitalis, 20 cervical paraspinals, 30 trapezius. Total of 155U divided in 31 sites. 45U botulinum wasted. \par \par Patient tolerated procedure well without complications. \par

## 2023-01-18 ENCOUNTER — APPOINTMENT (OUTPATIENT)
Dept: NEUROLOGY | Facility: CLINIC | Age: 43
End: 2023-01-18
Payer: COMMERCIAL

## 2023-01-18 VITALS
HEART RATE: 103 BPM | HEIGHT: 71 IN | TEMPERATURE: 98 F | OXYGEN SATURATION: 98 % | BODY MASS INDEX: 18.9 KG/M2 | SYSTOLIC BLOOD PRESSURE: 135 MMHG | WEIGHT: 135 LBS | DIASTOLIC BLOOD PRESSURE: 85 MMHG

## 2023-01-18 DIAGNOSIS — M54.16 RADICULOPATHY, LUMBAR REGION: ICD-10-CM

## 2023-01-18 DIAGNOSIS — I63.9 CEREBRAL INFARCTION, UNSPECIFIED: ICD-10-CM

## 2023-01-18 DIAGNOSIS — G43.909 MIGRAINE, UNSPECIFIED, NOT INTRACTABLE, W/OUT STATUS MIGRAINOSUS: ICD-10-CM

## 2023-01-18 PROCEDURE — 99214 OFFICE O/P EST MOD 30 MIN: CPT

## 2023-01-19 PROBLEM — M54.16 ACUTE LUMBAR RADICULOPATHY: Status: ACTIVE | Noted: 2020-07-11

## 2023-01-19 PROBLEM — I63.9 ISCHEMIC STROKE: Status: ACTIVE | Noted: 2019-11-11

## 2023-01-19 PROBLEM — G43.909 MIGRAINE: Status: ACTIVE | Noted: 2019-05-21

## 2023-01-19 NOTE — ASSESSMENT
[FreeTextEntry1] : Ms. Muller is here for follow up of her severe pain and weakness which appears to follow the pattern of a femoral neuropathy. She also has history of cervical spine disease, prior stroke, migraines, hearing loss, neuropathy (possibly diabetic). She has been dealing with pain and difficulty with ambulation. She fatigues very easily in lower extremities. She would qualify for an aide to assist her in school to help when she is required to walk or move any significant distance.\par \par Plan:\par - Has completed PT, had been going three times per week\par - Continue Botox for migraines every 3 - 4 months\par - Recommend to add monthly injectable medication Amavig 70 mg for migraines\par - Return to clinic in 6 months

## 2023-01-19 NOTE — PHYSICAL EXAM
[FreeTextEntry1] : AAOx3 language and attention normal \par CN 2-12 grossly intact\par Power diminished in right knee flexion/extension as well as in dorsiflexion\par Decreased sensation in femoral nerve distribution on the right as well as in the right UE to Temp and Vib\par DTR absent throughout\par FTN NL\par Gait antalgic

## 2023-01-19 NOTE — HISTORY OF PRESENT ILLNESS
[FreeTextEntry1] : Ms. 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 Since her last visit in December 2022 for Botox injections, she has been feeling worse. She states that her lower back pain and travels down the legs, and into the ankles and feet. 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 She has been getting pains in her legs and continues to have difficulty walking less than a block, she describes it as a "pulling" and an itchiness at nighttime. She requires rest when walking and will need to sit frequently. She has not been exercising recently as she has had a number of hopsitalizations for uterine bleeding requiring transfusions. She still gets burning down mostly her right leg but can occur in both legs.\par \par EMG from the past suggested right lumbar plexus dysfunction vs L2-L4 radiculopathy. She believes the pain is not improving much. Her hgba1c is 6.8 which is better then last hgba1c over 7.\par \par She had Botox injections done every 3-4 months, and before this past time she was getting relief from the injections. She was having daily migraines prior. She states the Botox injections would take 1 week roughly to kick in. She states that her migraines would resolve after that. She states that she will take Excedrin for her migraine if she catches it in time. She states that at times she is nauseous, she will put ice on her head and lay down.\par \par Current meds:\par Celexa once daily\par Citalopram 40 mg daily\par Humalog\par Vitamin D\par Pantoprazole 40 mg daily\par Dicyclomine 10 mg\par Leucovorin 25 mg BID\par Iron\par Ferrous glucomate 250 mg TID\par \par 
10

## 2023-01-20 RX ORDER — ERENUMAB-AOOE 70 MG/ML
70 INJECTION SUBCUTANEOUS
Qty: 3 | Refills: 1 | Status: ACTIVE | COMMUNITY
Start: 2023-01-18 | End: 1900-01-01

## 2023-03-30 ENCOUNTER — APPOINTMENT (OUTPATIENT)
Dept: NEUROLOGY | Facility: CLINIC | Age: 43
End: 2023-03-30
Payer: COMMERCIAL

## 2023-03-30 PROCEDURE — 64615 CHEMODENERV MUSC MIGRAINE: CPT

## 2023-03-30 NOTE — PROCEDURE
[FreeTextEntry1] : Botulinum toxin for chronic migraine\par Botulinum toxin for chronic migraine\par Botulinum toxin for chronic migraine\par Botulinum toxin for chronic migraine\par  [FreeTextEntry3] : Botulinum toxin for chronic migraine\par \par Risks and benefits of procedure explained. Consent for botulinum toxin for chronic migraine signed in chart. \par \par 200 Units botulinum toxin (Lot # [id number] X2 , exp: 09/2025  ) reconstituted with 4 ml 0.9NS (5U/0.1cc). \par \par Patient prepped with alcohol/ethyl chloride. 10U corrugators, 5U procerus, 20U frontalis, 40 temporalis, 30 occipitalis, 20 cervical paraspinals, 30 trapezius. Total of 155U divided in 31 sites. 45U botulinum wasted. \par \par Patient tolerated procedure well without complications.

## 2023-07-13 ENCOUNTER — APPOINTMENT (OUTPATIENT)
Dept: NEUROLOGY | Facility: CLINIC | Age: 43
End: 2023-07-13
Payer: COMMERCIAL

## 2023-07-13 PROCEDURE — 64615 CHEMODENERV MUSC MIGRAINE: CPT

## 2023-07-13 RX ADMIN — ONABOTULINUMTOXINA 55 UNIT: 100 INJECTION, POWDER, LYOPHILIZED, FOR SOLUTION INTRADERMAL; INTRAMUSCULAR at 00:00

## 2023-07-13 RX ADMIN — ONABOTULINUMTOXINA 1 UNIT: 100 INJECTION, POWDER, LYOPHILIZED, FOR SOLUTION INTRADERMAL; INTRAMUSCULAR at 00:00

## 2023-07-13 NOTE — PROCEDURE
[FreeTextEntry3] : Botulinum toxin for chronic migraine\par \par Risks and benefits of procedure explained. Consent for botulinum toxin for chronic migraine signed in chart. \par \par 200 Units botulinum toxin (Lot # [id number] X2 , exp: 11/2025 ) reconstituted with 4 ml 0.9NS (5U/0.1cc). \par \par Patient prepped with alcohol/ethyl chloride. 10U corrugators, 5U procerus, 20U frontalis, 40 temporalis, 30 occipitalis, 20 cervical paraspinals, 30 trapezius. Total of 155U divided in 31 sites. 45U botulinum wasted. \par \par Patient tolerated procedure well without complications. \par  \par \par \par

## 2023-07-19 ENCOUNTER — NON-APPOINTMENT (OUTPATIENT)
Age: 43
End: 2023-07-19

## 2023-08-17 ENCOUNTER — APPOINTMENT (OUTPATIENT)
Dept: SPEECH THERAPY | Facility: CLINIC | Age: 43
End: 2023-08-17

## 2023-08-17 ENCOUNTER — OUTPATIENT (OUTPATIENT)
Dept: OUTPATIENT SERVICES | Facility: HOSPITAL | Age: 43
LOS: 1 days | End: 2023-08-17
Payer: COMMERCIAL

## 2023-08-17 DIAGNOSIS — H90.3 SENSORINEURAL HEARING LOSS, BILATERAL: ICD-10-CM

## 2023-08-17 PROCEDURE — 92550 TYMPANOMETRY & REFLEX THRESH: CPT

## 2023-08-17 PROCEDURE — 92557 COMPREHENSIVE HEARING TEST: CPT

## 2023-08-24 ENCOUNTER — APPOINTMENT (OUTPATIENT)
Dept: SPEECH THERAPY | Facility: CLINIC | Age: 43
End: 2023-08-24

## 2023-08-24 ENCOUNTER — OUTPATIENT (OUTPATIENT)
Dept: OUTPATIENT SERVICES | Facility: HOSPITAL | Age: 43
LOS: 1 days | End: 2023-08-24
Payer: SELF-PAY

## 2023-08-24 DIAGNOSIS — H90.3 SENSORINEURAL HEARING LOSS, BILATERAL: ICD-10-CM

## 2023-08-24 PROCEDURE — V5261: CPT

## 2023-08-24 PROCEDURE — V5160: CPT

## 2023-08-31 ENCOUNTER — APPOINTMENT (OUTPATIENT)
Dept: SPEECH THERAPY | Facility: CLINIC | Age: 43
End: 2023-08-31

## 2023-08-31 ENCOUNTER — OUTPATIENT (OUTPATIENT)
Dept: OUTPATIENT SERVICES | Facility: HOSPITAL | Age: 43
LOS: 1 days | End: 2023-08-31

## 2023-08-31 DIAGNOSIS — H90.3 SENSORINEURAL HEARING LOSS, BILATERAL: ICD-10-CM

## 2023-09-29 ENCOUNTER — APPOINTMENT (OUTPATIENT)
Dept: NEUROLOGY | Facility: CLINIC | Age: 43
End: 2023-09-29
Payer: COMMERCIAL

## 2023-09-29 VITALS
HEIGHT: 71 IN | WEIGHT: 160 LBS | SYSTOLIC BLOOD PRESSURE: 153 MMHG | DIASTOLIC BLOOD PRESSURE: 101 MMHG | BODY MASS INDEX: 22.4 KG/M2 | HEART RATE: 94 BPM

## 2023-09-29 DIAGNOSIS — G62.9 POLYNEUROPATHY, UNSPECIFIED: ICD-10-CM

## 2023-09-29 PROCEDURE — 99214 OFFICE O/P EST MOD 30 MIN: CPT

## 2023-09-29 RX ORDER — ONABOTULINUMTOXINA 100 [USP'U]/1
100 INJECTION, POWDER, LYOPHILIZED, FOR SOLUTION INTRADERMAL; INTRAMUSCULAR
Qty: 1 | Refills: 0 | Status: DISCONTINUED | COMMUNITY
Start: 2019-12-13 | End: 2023-09-29

## 2023-09-29 RX ORDER — TRAMADOL HYDROCHLORIDE AND ACETAMINOPHEN 37.5; 325 MG/1; MG/1
37.5-325 TABLET, FILM COATED ORAL DAILY
Qty: 30 | Refills: 0 | Status: DISCONTINUED | COMMUNITY
Start: 2020-09-30 | End: 2023-09-29

## 2023-09-29 RX ORDER — METHYLPREDNISOLONE 4 MG/1
4 TABLET ORAL
Qty: 1 | Refills: 0 | Status: DISCONTINUED | COMMUNITY
Start: 2020-07-11 | End: 2023-09-29

## 2023-09-29 RX ORDER — METHOCARBAMOL 500 MG/1
500 TABLET, FILM COATED ORAL 3 TIMES DAILY
Qty: 45 | Refills: 0 | Status: DISCONTINUED | COMMUNITY
Start: 2020-07-11 | End: 2023-09-29

## 2023-10-02 PROBLEM — G62.9 NEUROPATHY: Status: ACTIVE | Noted: 2020-07-20

## 2023-10-12 ENCOUNTER — APPOINTMENT (OUTPATIENT)
Dept: NEUROLOGY | Facility: CLINIC | Age: 43
End: 2023-10-12
Payer: COMMERCIAL

## 2023-10-12 PROCEDURE — 64615 CHEMODENERV MUSC MIGRAINE: CPT

## 2023-10-12 RX ADMIN — ONABOTULINUMTOXINA 55 UNIT: 100 INJECTION, POWDER, LYOPHILIZED, FOR SOLUTION INTRADERMAL; INTRAMUSCULAR at 00:00

## 2023-10-12 RX ADMIN — ONABOTULINUMTOXINA 1 UNIT: 100 INJECTION, POWDER, LYOPHILIZED, FOR SOLUTION INTRADERMAL; INTRAMUSCULAR at 00:00

## 2023-12-04 RX ORDER — RIMEGEPANT SULFATE 75 MG/75MG
75 TABLET, ORALLY DISINTEGRATING ORAL
Qty: 16 | Refills: 5 | Status: ACTIVE | COMMUNITY
Start: 2022-08-04 | End: 1900-01-01

## 2023-12-06 ENCOUNTER — APPOINTMENT (OUTPATIENT)
Dept: NEUROLOGY | Facility: CLINIC | Age: 43
End: 2023-12-06

## 2023-12-07 ENCOUNTER — APPOINTMENT (OUTPATIENT)
Dept: NEUROLOGY | Facility: CLINIC | Age: 43
End: 2023-12-07
Payer: COMMERCIAL

## 2023-12-07 VITALS
WEIGHT: 160 LBS | HEART RATE: 90 BPM | SYSTOLIC BLOOD PRESSURE: 134 MMHG | HEIGHT: 71 IN | BODY MASS INDEX: 22.4 KG/M2 | DIASTOLIC BLOOD PRESSURE: 87 MMHG

## 2023-12-07 DIAGNOSIS — M25.552 PAIN IN RIGHT HIP: ICD-10-CM

## 2023-12-07 DIAGNOSIS — M25.551 PAIN IN RIGHT HIP: ICD-10-CM

## 2023-12-07 DIAGNOSIS — M25.529 PAIN IN UNSPECIFIED ELBOW: ICD-10-CM

## 2023-12-07 PROCEDURE — 99213 OFFICE O/P EST LOW 20 MIN: CPT

## 2024-01-19 ENCOUNTER — APPOINTMENT (OUTPATIENT)
Dept: NEUROLOGY | Facility: CLINIC | Age: 44
End: 2024-01-19
Payer: COMMERCIAL

## 2024-01-19 VITALS
HEIGHT: 71 IN | SYSTOLIC BLOOD PRESSURE: 143 MMHG | BODY MASS INDEX: 22.4 KG/M2 | DIASTOLIC BLOOD PRESSURE: 87 MMHG | WEIGHT: 160 LBS | OXYGEN SATURATION: 98 % | HEART RATE: 101 BPM

## 2024-01-19 PROCEDURE — 99213 OFFICE O/P EST LOW 20 MIN: CPT | Mod: 25

## 2024-01-19 PROCEDURE — 64615 CHEMODENERV MUSC MIGRAINE: CPT

## 2024-01-19 RX ADMIN — ONABOTULINUMTOXINA 100 UNIT: 100 INJECTION, POWDER, LYOPHILIZED, FOR SOLUTION INTRADERMAL; INTRAMUSCULAR at 00:00

## 2024-01-19 RX ADMIN — ONABOTULINUMTOXINA 0.55 UNIT: 100 INJECTION, POWDER, LYOPHILIZED, FOR SOLUTION INTRADERMAL; INTRAMUSCULAR at 00:00

## 2024-01-19 NOTE — PHYSICAL EXAM
[FreeTextEntry1] : NAD.  AOx3.  Intact memory.  Speech fluent, slight dysarthria.  CN 2 - 12 [place] b/l with hearing aids. Strength 5/5 b/l UE/LE.  NL tone, bulk.  No abnl movements.  Plantar response downgoing b/l.  (-) Hoffmans, clonus.  Sensory intact LT/PP, pain, temp, proprioception and vibration.  NL FTN/HKS.  No dysdiadokinesia.  Gait narrow based/NL tandem.

## 2024-01-19 NOTE — HISTORY OF PRESENT ILLNESS
[FreeTextEntry1] : Since her last visit w/ Dr. Cruz, Ms. Muller had good response to chemodenervation with effect lasting for > 2 months.  Still has breakthrough migraines but significantly reduced with chemodenervation with Aimovig injections.  Continues to take Nurtec as abortive medications.  No side effects to chemodenervation since starting several years ago and has been cleared for continued chemodenervation by her mitochondrial specialist Dr. Ross ([facility]).  Is otherwise in her usual state of health.

## 2024-01-19 NOTE — PROCEDURE
[FreeTextEntry1] : Procedure:  Botulinum toxin for chronic migraine  Risks and benefits of procedure explained.  Consent for botulinum toxin for chronic migraine signed in chart.    200 Units botulinum toxin (Lot # [id number] C4, 3/2026; [id number] C4, 10/2025) reconstituted with 4 ml 0.9NS (5U/0.1cc).    Patient prepped with alcohol/ethyl chloride.  10U corrugators, 5U procerus, 20U frontalis, 40 temporalis, 30 occipitalis, 20 cervical paraspinals, 30 trapezius.  Total of 155U divided in 31 sites.  45U botulinum wasted.    Patient tolerated procedure well without complications.

## 2024-01-19 NOTE — ASSESSMENT
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 Ross ([facility]) currently here for continued chemodenervation with botulinum for migraine.  Botulinum injected without complications.    Recommendations: - continue abortive and prophylactic treatment as per Dr. Rogel - continue f/u with Dr. Ross for mitochondrial disorder - RTC in 3 months for chemodenervation as needed

## 2024-01-26 ENCOUNTER — APPOINTMENT (OUTPATIENT)
Dept: ORTHOPEDIC SURGERY | Facility: CLINIC | Age: 44
End: 2024-01-26
Payer: COMMERCIAL

## 2024-01-26 VITALS
OXYGEN SATURATION: 100 % | DIASTOLIC BLOOD PRESSURE: 82 MMHG | HEIGHT: 70 IN | BODY MASS INDEX: 22.9 KG/M2 | SYSTOLIC BLOOD PRESSURE: 110 MMHG | WEIGHT: 160 LBS

## 2024-01-26 DIAGNOSIS — M77.8 OTHER ENTHESOPATHIES, NOT ELSEWHERE CLASSIFIED: ICD-10-CM

## 2024-01-26 DIAGNOSIS — M25.822 OTHER SPECIFIED JOINT DISORDERS, LEFT ELBOW: ICD-10-CM

## 2024-01-26 PROCEDURE — 99203 OFFICE O/P NEW LOW 30 MIN: CPT

## 2024-02-09 ENCOUNTER — APPOINTMENT (OUTPATIENT)
Dept: ORTHOPEDIC SURGERY | Facility: CLINIC | Age: 44
End: 2024-02-09
Payer: COMMERCIAL

## 2024-02-09 DIAGNOSIS — M51.37 OTHER INTERVERTEBRAL DISC DEGENERATION, LUMBOSACRAL REGION: ICD-10-CM

## 2024-02-09 PROCEDURE — 73522 X-RAY EXAM HIPS BI 3-4 VIEWS: CPT

## 2024-02-09 PROCEDURE — 99213 OFFICE O/P EST LOW 20 MIN: CPT

## 2024-02-09 PROCEDURE — 72110 X-RAY EXAM L-2 SPINE 4/>VWS: CPT

## 2024-04-18 RX ORDER — ONABOTULINUMTOXINA 200 [USP'U]/1
200 INJECTION, POWDER, LYOPHILIZED, FOR SOLUTION INTRADERMAL; INTRAMUSCULAR
Qty: 1 | Refills: 4 | Status: ACTIVE | COMMUNITY
Start: 2024-04-05 | End: 1900-01-01

## 2024-06-20 ENCOUNTER — APPOINTMENT (OUTPATIENT)
Dept: NEUROLOGY | Facility: CLINIC | Age: 44
End: 2024-06-20
Payer: COMMERCIAL

## 2024-06-20 VITALS
SYSTOLIC BLOOD PRESSURE: 111 MMHG | DIASTOLIC BLOOD PRESSURE: 75 MMHG | HEIGHT: 70 IN | WEIGHT: 162.44 LBS | HEART RATE: 89 BPM | BODY MASS INDEX: 23.25 KG/M2

## 2024-06-20 DIAGNOSIS — M54.17 RADICULOPATHY, LUMBOSACRAL REGION: ICD-10-CM

## 2024-06-20 DIAGNOSIS — M54.12 RADICULOPATHY, CERVICAL REGION: ICD-10-CM

## 2024-06-20 DIAGNOSIS — R41.3 OTHER AMNESIA: ICD-10-CM

## 2024-06-20 PROCEDURE — G2211 COMPLEX E/M VISIT ADD ON: CPT | Mod: NC

## 2024-06-20 PROCEDURE — 99214 OFFICE O/P EST MOD 30 MIN: CPT

## 2024-06-21 PROBLEM — M54.17 LUMBOSACRAL RADICULOPATHY AT L5: Status: ACTIVE | Noted: 2024-02-09

## 2024-06-21 PROBLEM — R41.3 MEMORY DIFFICULTIES: Status: ACTIVE | Noted: 2019-06-10

## 2024-06-21 PROBLEM — M54.12 CERVICAL RADICULOPATHY: Status: ACTIVE | Noted: 2019-11-11

## 2024-06-21 RX ORDER — FERROUS SULFATE 159 MG
250 CAPSULE, EXTENDED RELEASE ORAL
Refills: 0 | Status: ACTIVE | COMMUNITY

## 2024-06-21 NOTE — HISTORY OF PRESENT ILLNESS
[FreeTextEntry1] : Ms. DAVIES 44 year old female presents today for a follow up visit. H/o CVA, migraine, femoral neuropathy, hearing loss, right lumbar plexus dysfunction vs L2-L4 radiculopathy, and ?mitochondrial disorder.  Since her last visit has a lot of back pain, was advised to lose weight, had a bone spur in left elbow and had xray pelvis was also told slipped disc in her l5. Pain is 10/10 constant, taking prn tylenol and motrin for it. Not responding much. She does not want to take any other medications. Left neck is also bothersome it is radiating down her shoulder blade. Migraines are occurring about 1x per week, when she gets a migraine it can last 15 hours, takes prn nurtec which helps. She just has to take it in time. Usually responds within 25 mins. Her last botox appt was in January of 2024. Not currently on aimovig due to GI issues. Says when she gets botox she does not get any migraines.   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

## 2024-06-21 NOTE — DISCUSSION/SUMMARY
[FreeTextEntry1] : Ms. DAVIES 43 year old female presents today for a follow up visit. H/o CVA, migraine, femoral neuropathy, hearing loss, right lumbar plexus dysfunction vs L2-L4 radiculopathy, and ?mitochondrial disorder.  She continues to be in pain throughout and has gained weight. 1. PT for cervical and lumbar spine (encouraged to continue on her own once comfortable with stretches and exercises) 2. Continue current medicaitons 3. F/u with pain management 4. f/u in 6 months 5. Psychitry f/u and continue with therapist

## 2024-06-21 NOTE — PHYSICAL EXAM
[FreeTextEntry1] : Mental status: Awake, alert and oriented x3. No dysarthria, no aphasia.    Cranial nerves: Pupils equally round and reactive to light, visual fields full, no nystagmus, extraocular muscles intact, V1 through V3 intact bilaterally and symmetric, face symmetric, hearing intact to finger rub, palate elevation symmetric, tongue was midline.  Motor:  MRC grading 5/5 b/l UE/LE.   strength 5/5.  Normal tone and bulk.  No abnormal movements.   Sensation: Intact to light touch, temperature, vibration, pp, weaker to temperature and vibration in RLE > LLE  Coordination: No dysmetria on finger-to-nose   Reflexes: Trace throughout   Gait: Narrow and steady. No ataxia.  Romberg negative

## 2024-07-01 ENCOUNTER — APPOINTMENT (OUTPATIENT)
Dept: NEUROLOGY | Facility: CLINIC | Age: 44
End: 2024-07-01
Payer: COMMERCIAL

## 2024-07-01 VITALS
DIASTOLIC BLOOD PRESSURE: 86 MMHG | WEIGHT: 162 LBS | HEIGHT: 70 IN | TEMPERATURE: 98 F | HEART RATE: 91 BPM | OXYGEN SATURATION: 98 % | SYSTOLIC BLOOD PRESSURE: 127 MMHG | BODY MASS INDEX: 23.19 KG/M2

## 2024-07-01 DIAGNOSIS — G43.919 MIGRAINE, UNSPECIFIED, INTRACTABLE, W/OUT STATUS MIGRAINOSUS: ICD-10-CM

## 2024-07-01 PROCEDURE — 64615 CHEMODENERV MUSC MIGRAINE: CPT

## 2024-07-01 PROCEDURE — 99212 OFFICE O/P EST SF 10 MIN: CPT | Mod: 25

## 2024-07-01 RX ADMIN — ONABOTULINUMTOXINA 0.78 UNIT: 200 INJECTION, POWDER, LYOPHILIZED, FOR SOLUTION INTRADERMAL; INTRAMUSCULAR at 00:00

## 2024-07-15 ENCOUNTER — APPOINTMENT (OUTPATIENT)
Dept: NEUROLOGY | Facility: CLINIC | Age: 44
End: 2024-07-15

## 2024-08-14 ENCOUNTER — APPOINTMENT (OUTPATIENT)
Dept: SPEECH THERAPY | Facility: CLINIC | Age: 44
End: 2024-08-14

## 2024-10-09 ENCOUNTER — APPOINTMENT (OUTPATIENT)
Dept: NEUROLOGY | Facility: CLINIC | Age: 44
End: 2024-10-09
Payer: COMMERCIAL

## 2024-10-09 DIAGNOSIS — G43.909 MIGRAINE, UNSPECIFIED, NOT INTRACTABLE, W/OUT STATUS MIGRAINOSUS: ICD-10-CM

## 2024-10-09 DIAGNOSIS — G62.9 POLYNEUROPATHY, UNSPECIFIED: ICD-10-CM

## 2024-10-09 PROCEDURE — 64615 CHEMODENERV MUSC MIGRAINE: CPT

## 2024-10-09 PROCEDURE — 99214 OFFICE O/P EST MOD 30 MIN: CPT | Mod: 25

## 2024-10-09 RX ORDER — ONABOTULINUMTOXINA 200 [USP'U]/1
200 INJECTION, POWDER, LYOPHILIZED, FOR SOLUTION INTRADERMAL; INTRAMUSCULAR
Qty: 1 | Refills: 0 | Status: ACTIVE | COMMUNITY
Start: 2024-10-09

## 2024-11-02 ENCOUNTER — APPOINTMENT (OUTPATIENT)
Dept: ORTHOPEDIC SURGERY | Facility: CLINIC | Age: 44
End: 2024-11-02
Payer: COMMERCIAL

## 2024-11-02 DIAGNOSIS — S93.401A SPRAIN OF UNSPECIFIED LIGAMENT OF RIGHT ANKLE, INITIAL ENCOUNTER: ICD-10-CM

## 2024-11-02 PROCEDURE — 73610 X-RAY EXAM OF ANKLE: CPT | Mod: RT

## 2024-11-02 PROCEDURE — 99203 OFFICE O/P NEW LOW 30 MIN: CPT | Mod: 25

## 2024-12-04 ENCOUNTER — APPOINTMENT (OUTPATIENT)
Dept: ORTHOPEDIC SURGERY | Facility: CLINIC | Age: 44
End: 2024-12-04

## 2024-12-18 ENCOUNTER — APPOINTMENT (OUTPATIENT)
Dept: ORTHOPEDIC SURGERY | Facility: CLINIC | Age: 44
End: 2024-12-18
Payer: COMMERCIAL

## 2024-12-18 DIAGNOSIS — S93.401A SPRAIN OF UNSPECIFIED LIGAMENT OF RIGHT ANKLE, INITIAL ENCOUNTER: ICD-10-CM

## 2024-12-18 PROCEDURE — 99203 OFFICE O/P NEW LOW 30 MIN: CPT

## 2025-01-09 ENCOUNTER — OUTPATIENT (OUTPATIENT)
Dept: OUTPATIENT SERVICES | Facility: HOSPITAL | Age: 45
LOS: 1 days | End: 2025-01-09
Payer: COMMERCIAL

## 2025-01-09 DIAGNOSIS — Z12.31 ENCOUNTER FOR SCREENING MAMMOGRAM FOR MALIGNANT NEOPLASM OF BREAST: ICD-10-CM

## 2025-01-09 DIAGNOSIS — Z00.8 ENCOUNTER FOR OTHER GENERAL EXAMINATION: ICD-10-CM

## 2025-01-09 PROCEDURE — 77063 BREAST TOMOSYNTHESIS BI: CPT | Mod: 26

## 2025-01-09 PROCEDURE — 77067 SCR MAMMO BI INCL CAD: CPT

## 2025-01-09 PROCEDURE — 77067 SCR MAMMO BI INCL CAD: CPT | Mod: 26

## 2025-01-09 PROCEDURE — 77063 BREAST TOMOSYNTHESIS BI: CPT

## 2025-01-10 DIAGNOSIS — Z12.31 ENCOUNTER FOR SCREENING MAMMOGRAM FOR MALIGNANT NEOPLASM OF BREAST: ICD-10-CM

## 2025-01-15 ENCOUNTER — APPOINTMENT (OUTPATIENT)
Dept: NEUROLOGY | Facility: CLINIC | Age: 45
End: 2025-01-15
Payer: COMMERCIAL

## 2025-01-15 ENCOUNTER — APPOINTMENT (OUTPATIENT)
Dept: ORTHOPEDIC SURGERY | Facility: CLINIC | Age: 45
End: 2025-01-15

## 2025-01-15 DIAGNOSIS — M79.604 PAIN IN RIGHT LEG: ICD-10-CM

## 2025-01-15 DIAGNOSIS — G43.909 MIGRAINE, UNSPECIFIED, NOT INTRACTABLE, W/OUT STATUS MIGRAINOSUS: ICD-10-CM

## 2025-01-15 DIAGNOSIS — R41.3 OTHER AMNESIA: ICD-10-CM

## 2025-01-15 PROCEDURE — 99214 OFFICE O/P EST MOD 30 MIN: CPT | Mod: 25

## 2025-01-15 PROCEDURE — 64615 CHEMODENERV MUSC MIGRAINE: CPT

## 2025-01-15 RX ORDER — ONABOTULINUMTOXINA 200 [USP'U]/1
200 INJECTION, POWDER, LYOPHILIZED, FOR SOLUTION INTRADERMAL; INTRAMUSCULAR
Qty: 155 | Refills: 0 | Status: ACTIVE | COMMUNITY
Start: 2025-01-15

## 2025-01-27 ENCOUNTER — NON-APPOINTMENT (OUTPATIENT)
Age: 45
End: 2025-01-27

## 2025-04-23 ENCOUNTER — APPOINTMENT (OUTPATIENT)
Dept: NEUROLOGY | Facility: CLINIC | Age: 45
End: 2025-04-23
Payer: COMMERCIAL

## 2025-04-23 VITALS
HEART RATE: 81 BPM | DIASTOLIC BLOOD PRESSURE: 96 MMHG | BODY MASS INDEX: 27.31 KG/M2 | HEIGHT: 64 IN | SYSTOLIC BLOOD PRESSURE: 150 MMHG | WEIGHT: 160 LBS | OXYGEN SATURATION: 99 %

## 2025-04-23 DIAGNOSIS — G43.909 MIGRAINE, UNSPECIFIED, NOT INTRACTABLE, W/OUT STATUS MIGRAINOSUS: ICD-10-CM

## 2025-04-23 DIAGNOSIS — G62.9 POLYNEUROPATHY, UNSPECIFIED: ICD-10-CM

## 2025-04-23 DIAGNOSIS — R41.3 OTHER AMNESIA: ICD-10-CM

## 2025-04-23 DIAGNOSIS — M54.17 RADICULOPATHY, LUMBOSACRAL REGION: ICD-10-CM

## 2025-04-23 PROCEDURE — G2211 COMPLEX E/M VISIT ADD ON: CPT | Mod: NC

## 2025-04-23 PROCEDURE — 64615 CHEMODENERV MUSC MIGRAINE: CPT

## 2025-04-23 PROCEDURE — 99215 OFFICE O/P EST HI 40 MIN: CPT

## 2025-04-23 RX ORDER — ESCITALOPRAM OXALATE 20 MG/1
20 TABLET, FILM COATED ORAL DAILY
Refills: 0 | Status: ACTIVE | COMMUNITY

## 2025-04-23 RX ORDER — INSULIN LISPRO 100 [IU]/ML
100 INJECTION, SOLUTION INTRAVENOUS; SUBCUTANEOUS
Refills: 0 | Status: ACTIVE | COMMUNITY

## 2025-04-23 RX ORDER — VONOPRAZAN FUMARATE 13.36 MG/1
10 TABLET ORAL
Refills: 0 | Status: ACTIVE | COMMUNITY

## 2025-04-23 RX ORDER — ATORVASTATIN CALCIUM 10 MG/1
10 TABLET, FILM COATED ORAL DAILY
Refills: 0 | Status: ACTIVE | COMMUNITY

## 2025-06-03 ENCOUNTER — OUTPATIENT (OUTPATIENT)
Dept: OUTPATIENT SERVICES | Facility: HOSPITAL | Age: 45
LOS: 1 days | End: 2025-06-03

## 2025-06-03 ENCOUNTER — APPOINTMENT (OUTPATIENT)
Dept: SPEECH THERAPY | Facility: CLINIC | Age: 45
End: 2025-06-03

## 2025-06-03 DIAGNOSIS — H90.3 SENSORINEURAL HEARING LOSS, BILATERAL: ICD-10-CM

## 2025-06-04 DIAGNOSIS — H90.3 SENSORINEURAL HEARING LOSS, BILATERAL: ICD-10-CM

## 2025-06-17 ENCOUNTER — APPOINTMENT (OUTPATIENT)
Dept: SPEECH THERAPY | Facility: CLINIC | Age: 45
End: 2025-06-17

## 2025-06-17 ENCOUNTER — OUTPATIENT (OUTPATIENT)
Dept: OUTPATIENT SERVICES | Facility: HOSPITAL | Age: 45
LOS: 1 days | End: 2025-06-17

## 2025-06-17 DIAGNOSIS — H90.3 SENSORINEURAL HEARING LOSS, BILATERAL: ICD-10-CM

## 2025-07-16 RX ORDER — UBROGEPANT 50 MG/1
50 TABLET ORAL
Qty: 8 | Refills: 3 | Status: ACTIVE | COMMUNITY
Start: 2025-07-16 | End: 1900-01-01

## 2025-08-06 ENCOUNTER — APPOINTMENT (OUTPATIENT)
Dept: NEUROLOGY | Facility: CLINIC | Age: 45
End: 2025-08-06
Payer: COMMERCIAL

## 2025-08-06 PROCEDURE — 64615 CHEMODENERV MUSC MIGRAINE: CPT

## 2025-08-07 ENCOUNTER — APPOINTMENT (OUTPATIENT)
Dept: SPEECH THERAPY | Facility: CLINIC | Age: 45
End: 2025-08-07

## 2025-08-07 ENCOUNTER — OUTPATIENT (OUTPATIENT)
Dept: OUTPATIENT SERVICES | Facility: HOSPITAL | Age: 45
LOS: 1 days | End: 2025-08-07
Payer: COMMERCIAL

## 2025-08-07 DIAGNOSIS — H90.3 SENSORINEURAL HEARING LOSS, BILATERAL: ICD-10-CM

## 2025-08-07 PROCEDURE — 92550 TYMPANOMETRY & REFLEX THRESH: CPT

## 2025-08-07 PROCEDURE — 92557 COMPREHENSIVE HEARING TEST: CPT

## 2025-08-11 ENCOUNTER — APPOINTMENT (OUTPATIENT)
Dept: NEUROPSYCHOLOGY | Facility: CLINIC | Age: 45
End: 2025-08-11

## 2025-08-14 ENCOUNTER — APPOINTMENT (OUTPATIENT)
Dept: NEUROPSYCHOLOGY | Facility: CLINIC | Age: 45
End: 2025-08-14

## 2025-08-18 ENCOUNTER — APPOINTMENT (OUTPATIENT)
Dept: NEUROPSYCHOLOGY | Facility: CLINIC | Age: 45
End: 2025-08-18

## 2025-09-05 ENCOUNTER — APPOINTMENT (OUTPATIENT)
Dept: NEUROPSYCHOLOGY | Facility: CLINIC | Age: 45
End: 2025-09-05